# Patient Record
Sex: FEMALE | Race: WHITE | NOT HISPANIC OR LATINO | URBAN - METROPOLITAN AREA
[De-identification: names, ages, dates, MRNs, and addresses within clinical notes are randomized per-mention and may not be internally consistent; named-entity substitution may affect disease eponyms.]

---

## 2017-05-18 ENCOUNTER — OUTPATIENT (OUTPATIENT)
Dept: OUTPATIENT SERVICES | Facility: HOSPITAL | Age: 72
LOS: 1 days | End: 2017-05-18
Payer: MEDICARE

## 2017-05-18 VITALS
HEART RATE: 79 BPM | HEIGHT: 66 IN | RESPIRATION RATE: 14 BRPM | TEMPERATURE: 98 F | WEIGHT: 171.96 LBS | DIASTOLIC BLOOD PRESSURE: 70 MMHG | OXYGEN SATURATION: 98 % | SYSTOLIC BLOOD PRESSURE: 118 MMHG

## 2017-05-18 DIAGNOSIS — Z90.49 ACQUIRED ABSENCE OF OTHER SPECIFIED PARTS OF DIGESTIVE TRACT: Chronic | ICD-10-CM

## 2017-05-18 DIAGNOSIS — Z98.890 OTHER SPECIFIED POSTPROCEDURAL STATES: Chronic | ICD-10-CM

## 2017-05-18 DIAGNOSIS — K43.2 INCISIONAL HERNIA WITHOUT OBSTRUCTION OR GANGRENE: ICD-10-CM

## 2017-05-18 DIAGNOSIS — K45.0 OTHER SPECIFIED ABDOMINAL HERNIA WITH OBSTRUCTION, WITHOUT GANGRENE: ICD-10-CM

## 2017-05-18 DIAGNOSIS — Z01.818 ENCOUNTER FOR OTHER PREPROCEDURAL EXAMINATION: ICD-10-CM

## 2017-05-18 LAB
ANION GAP SERPL CALC-SCNC: 4 MMOL/L — LOW (ref 5–17)
BUN SERPL-MCNC: 15 MG/DL — SIGNIFICANT CHANGE UP (ref 7–23)
CALCIUM SERPL-MCNC: 9.1 MG/DL — SIGNIFICANT CHANGE UP (ref 8.4–10.5)
CHLORIDE SERPL-SCNC: 105 MMOL/L — SIGNIFICANT CHANGE UP (ref 96–108)
CO2 SERPL-SCNC: 30 MMOL/L — SIGNIFICANT CHANGE UP (ref 22–31)
CREAT SERPL-MCNC: 0.73 MG/DL — SIGNIFICANT CHANGE UP (ref 0.5–1.3)
GLUCOSE SERPL-MCNC: 103 MG/DL — HIGH (ref 70–99)
HCT VFR BLD CALC: 40.1 % — SIGNIFICANT CHANGE UP (ref 34.5–45)
HGB BLD-MCNC: 13.6 G/DL — SIGNIFICANT CHANGE UP (ref 11.5–15.5)
MCHC RBC-ENTMCNC: 29.4 PG — SIGNIFICANT CHANGE UP (ref 27–34)
MCHC RBC-ENTMCNC: 33.8 GM/DL — SIGNIFICANT CHANGE UP (ref 32–36)
MCV RBC AUTO: 86.9 FL — SIGNIFICANT CHANGE UP (ref 80–100)
PLATELET # BLD AUTO: 261 K/UL — SIGNIFICANT CHANGE UP (ref 150–400)
POTASSIUM SERPL-MCNC: 4.4 MMOL/L — SIGNIFICANT CHANGE UP (ref 3.5–5.3)
POTASSIUM SERPL-SCNC: 4.4 MMOL/L — SIGNIFICANT CHANGE UP (ref 3.5–5.3)
RBC # BLD: 4.62 M/UL — SIGNIFICANT CHANGE UP (ref 3.8–5.2)
RBC # FLD: 13 % — SIGNIFICANT CHANGE UP (ref 10.3–14.5)
SODIUM SERPL-SCNC: 139 MMOL/L — SIGNIFICANT CHANGE UP (ref 135–145)
WBC # BLD: 4.5 K/UL — SIGNIFICANT CHANGE UP (ref 3.8–10.5)
WBC # FLD AUTO: 4.5 K/UL — SIGNIFICANT CHANGE UP (ref 3.8–10.5)

## 2017-05-18 PROCEDURE — 80048 BASIC METABOLIC PNL TOTAL CA: CPT

## 2017-05-18 PROCEDURE — G0463: CPT

## 2017-05-18 PROCEDURE — 93005 ELECTROCARDIOGRAM TRACING: CPT

## 2017-05-18 PROCEDURE — 36415 COLL VENOUS BLD VENIPUNCTURE: CPT

## 2017-05-18 PROCEDURE — 85027 COMPLETE CBC AUTOMATED: CPT

## 2017-05-18 PROCEDURE — 93010 ELECTROCARDIOGRAM REPORT: CPT | Mod: NC

## 2017-05-18 RX ORDER — VALSARTAN 80 MG/1
1 TABLET ORAL
Qty: 0 | Refills: 0 | COMMUNITY

## 2017-05-18 RX ORDER — CHOLECALCIFEROL (VITAMIN D3) 125 MCG
0 CAPSULE ORAL
Qty: 0 | Refills: 0 | COMMUNITY

## 2017-05-18 NOTE — H&P PST ADULT - PSH
Ovarian cyst, left  1970's  S/P bunionectomy  right foot aug-  S/P Mohs surgery for basal cell carcinoma  2012  S/P small bowel resection  8/2016  Toe anomaly  toe right foot

## 2017-05-18 NOTE — H&P PST ADULT - HISTORY OF PRESENT ILLNESS
This is a 72 y/o female who had undergone small bowel resection August , 2016 presents with complaint of abdominal pain and incisional hernia  . patient states she noticed the bulge 2 months ago and she was symptomatic . Cat scan was done . scheduled for open repair incarcerated incisional hernia

## 2017-05-18 NOTE — H&P PST ADULT - PMH
Asthma  mild  Bronchitis  2/2017  Hypertension    Small bowel obstruction due to adhesions Asthma  mild  Bronchitis  2/2017  Hypertension    Incisional hernia    Small bowel obstruction due to adhesions

## 2017-05-18 NOTE — H&P PST ADULT - NSANTHOSAYNRD_GEN_A_CORE
No. KELSY screening performed.  STOP BANG Legend: 0-2 = LOW Risk; 3-4 = INTERMEDIATE Risk; 5-8 = HIGH Risk

## 2017-08-09 ENCOUNTER — APPOINTMENT (OUTPATIENT)
Dept: MAMMOGRAPHY | Facility: CLINIC | Age: 72
End: 2017-08-09
Payer: MEDICARE

## 2017-08-09 ENCOUNTER — OUTPATIENT (OUTPATIENT)
Dept: OUTPATIENT SERVICES | Facility: HOSPITAL | Age: 72
LOS: 1 days | End: 2017-08-09
Payer: MEDICARE

## 2017-08-09 DIAGNOSIS — Z90.49 ACQUIRED ABSENCE OF OTHER SPECIFIED PARTS OF DIGESTIVE TRACT: Chronic | ICD-10-CM

## 2017-08-09 DIAGNOSIS — Z00.8 ENCOUNTER FOR OTHER GENERAL EXAMINATION: ICD-10-CM

## 2017-08-09 DIAGNOSIS — Z98.890 OTHER SPECIFIED POSTPROCEDURAL STATES: Chronic | ICD-10-CM

## 2017-08-09 PROCEDURE — 77067 SCR MAMMO BI INCL CAD: CPT

## 2017-08-09 PROCEDURE — 77063 BREAST TOMOSYNTHESIS BI: CPT | Mod: 26

## 2017-08-09 PROCEDURE — G0202: CPT | Mod: 26

## 2017-08-09 PROCEDURE — 77063 BREAST TOMOSYNTHESIS BI: CPT

## 2018-08-13 ENCOUNTER — APPOINTMENT (OUTPATIENT)
Dept: MAMMOGRAPHY | Facility: CLINIC | Age: 73
End: 2018-08-13
Payer: MEDICARE

## 2018-08-13 ENCOUNTER — OUTPATIENT (OUTPATIENT)
Dept: OUTPATIENT SERVICES | Facility: HOSPITAL | Age: 73
LOS: 1 days | End: 2018-08-13
Payer: MEDICARE

## 2018-08-13 DIAGNOSIS — Z98.890 OTHER SPECIFIED POSTPROCEDURAL STATES: Chronic | ICD-10-CM

## 2018-08-13 DIAGNOSIS — Z00.8 ENCOUNTER FOR OTHER GENERAL EXAMINATION: ICD-10-CM

## 2018-08-13 DIAGNOSIS — Z90.49 ACQUIRED ABSENCE OF OTHER SPECIFIED PARTS OF DIGESTIVE TRACT: Chronic | ICD-10-CM

## 2018-08-13 PROBLEM — K43.2 INCISIONAL HERNIA WITHOUT OBSTRUCTION OR GANGRENE: Chronic | Status: ACTIVE | Noted: 2017-05-18

## 2018-08-13 PROBLEM — J40 BRONCHITIS, NOT SPECIFIED AS ACUTE OR CHRONIC: Chronic | Status: ACTIVE | Noted: 2017-05-18

## 2018-08-13 PROCEDURE — 77067 SCR MAMMO BI INCL CAD: CPT

## 2018-08-13 PROCEDURE — 77063 BREAST TOMOSYNTHESIS BI: CPT

## 2018-08-13 PROCEDURE — 77067 SCR MAMMO BI INCL CAD: CPT | Mod: 26

## 2018-08-13 PROCEDURE — 77063 BREAST TOMOSYNTHESIS BI: CPT | Mod: 26

## 2019-08-05 ENCOUNTER — RX RENEWAL (OUTPATIENT)
Age: 74
End: 2019-08-05

## 2019-08-13 ENCOUNTER — OUTPATIENT (OUTPATIENT)
Dept: OUTPATIENT SERVICES | Facility: HOSPITAL | Age: 74
LOS: 1 days | End: 2019-08-13
Payer: MEDICARE

## 2019-08-13 ENCOUNTER — APPOINTMENT (OUTPATIENT)
Dept: MAMMOGRAPHY | Facility: CLINIC | Age: 74
End: 2019-08-13
Payer: MEDICARE

## 2019-08-13 DIAGNOSIS — Z00.8 ENCOUNTER FOR OTHER GENERAL EXAMINATION: ICD-10-CM

## 2019-08-13 DIAGNOSIS — Z90.49 ACQUIRED ABSENCE OF OTHER SPECIFIED PARTS OF DIGESTIVE TRACT: Chronic | ICD-10-CM

## 2019-08-13 DIAGNOSIS — Z98.890 OTHER SPECIFIED POSTPROCEDURAL STATES: Chronic | ICD-10-CM

## 2019-08-13 PROCEDURE — 77063 BREAST TOMOSYNTHESIS BI: CPT | Mod: 26

## 2019-08-13 PROCEDURE — 77067 SCR MAMMO BI INCL CAD: CPT | Mod: 26

## 2019-08-13 PROCEDURE — 77063 BREAST TOMOSYNTHESIS BI: CPT

## 2019-08-13 PROCEDURE — 77067 SCR MAMMO BI INCL CAD: CPT

## 2019-09-03 ENCOUNTER — APPOINTMENT (OUTPATIENT)
Dept: INTERNAL MEDICINE | Facility: CLINIC | Age: 74
End: 2019-09-03
Payer: MEDICARE

## 2019-09-03 VITALS
HEART RATE: 81 BPM | WEIGHT: 165 LBS | BODY MASS INDEX: 25.01 KG/M2 | HEIGHT: 68 IN | DIASTOLIC BLOOD PRESSURE: 81 MMHG | SYSTOLIC BLOOD PRESSURE: 124 MMHG | OXYGEN SATURATION: 95 %

## 2019-09-03 PROCEDURE — 99213 OFFICE O/P EST LOW 20 MIN: CPT

## 2019-09-03 NOTE — PHYSICAL EXAM
[No Acute Distress] : no acute distress [Well Nourished] : well nourished [Normal Sclera/Conjunctiva] : normal sclera/conjunctiva [Normal Outer Ear/Nose] : the outer ears and nose were normal in appearance [Normal Oropharynx] : the oropharynx was normal [No JVD] : no jugular venous distention [No Lymphadenopathy] : no lymphadenopathy [No Respiratory Distress] : no respiratory distress  [No Accessory Muscle Use] : no accessory muscle use [Clear to Auscultation] : lungs were clear to auscultation bilaterally [Normal Rate] : normal rate  [Regular Rhythm] : with a regular rhythm [No Edema] : there was no peripheral edema [No Extremity Clubbing/Cyanosis] : no extremity clubbing/cyanosis [Soft] : abdomen soft [Non Tender] : non-tender [Non-distended] : non-distended [No Masses] : no abdominal mass palpated

## 2019-09-03 NOTE — REVIEW OF SYSTEMS
[Fever] : no fever [Chills] : no chills [Chest Pain] : no chest pain [Palpitations] : no palpitations [Shortness Of Breath] : no shortness of breath [Wheezing] : no wheezing [Cough] : no cough [Abdominal Pain] : no abdominal pain

## 2019-10-23 ENCOUNTER — APPOINTMENT (OUTPATIENT)
Dept: INTERNAL MEDICINE | Facility: CLINIC | Age: 74
End: 2019-10-23
Payer: MEDICARE

## 2019-10-23 VITALS — SYSTOLIC BLOOD PRESSURE: 124 MMHG | DIASTOLIC BLOOD PRESSURE: 80 MMHG

## 2019-10-23 VITALS
BODY MASS INDEX: 25.01 KG/M2 | TEMPERATURE: 98.2 F | HEART RATE: 72 BPM | WEIGHT: 165 LBS | OXYGEN SATURATION: 95 % | HEIGHT: 68 IN

## 2019-10-23 PROCEDURE — 99213 OFFICE O/P EST LOW 20 MIN: CPT | Mod: 25

## 2019-10-23 PROCEDURE — 36415 COLL VENOUS BLD VENIPUNCTURE: CPT

## 2019-10-23 NOTE — PHYSICAL EXAM
[No JVD] : no jugular venous distention [No Acute Distress] : no acute distress [No Lymphadenopathy] : no lymphadenopathy [Supple] : supple [No Respiratory Distress] : no respiratory distress  [Clear to Auscultation] : lungs were clear to auscultation bilaterally [No Accessory Muscle Use] : no accessory muscle use [Regular Rhythm] : with a regular rhythm [Normal Rate] : normal rate  [Normal S1, S2] : normal S1 and S2 [No Edema] : there was no peripheral edema [No Extremity Clubbing/Cyanosis] : no extremity clubbing/cyanosis [Soft] : abdomen soft [Non-distended] : non-distended [Non Tender] : non-tender

## 2019-10-23 NOTE — REVIEW OF SYSTEMS
[Fever] : no fever [Chills] : no chills [Chest Pain] : no chest pain [Palpitations] : no palpitations [Lower Ext Edema] : no lower extremity edema [Shortness Of Breath] : no shortness of breath [Wheezing] : no wheezing [Cough] : no cough [Nausea] : no nausea [Abdominal Pain] : no abdominal pain [Memory Loss] : no memory loss [Headache] : no headache

## 2019-10-26 LAB
25(OH)D3 SERPL-MCNC: 43.3 NG/ML
ALBUMIN SERPL ELPH-MCNC: 4.4 G/DL
ALP BLD-CCNC: 77 U/L
ALT SERPL-CCNC: 8 U/L
ANION GAP SERPL CALC-SCNC: 13 MMOL/L
AST SERPL-CCNC: 13 U/L
BASOPHILS # BLD AUTO: 0.04 K/UL
BASOPHILS NFR BLD AUTO: 0.6 %
BILIRUB SERPL-MCNC: 0.3 MG/DL
BUN SERPL-MCNC: 11 MG/DL
CALCIUM SERPL-MCNC: 9.9 MG/DL
CHLORIDE SERPL-SCNC: 101 MMOL/L
CHOLEST SERPL-MCNC: 206 MG/DL
CHOLEST/HDLC SERPL: 2.5 RATIO
CO2 SERPL-SCNC: 25 MMOL/L
CREAT SERPL-MCNC: 0.66 MG/DL
EOSINOPHIL # BLD AUTO: 0.19 K/UL
EOSINOPHIL NFR BLD AUTO: 2.6 %
GLUCOSE SERPL-MCNC: 88 MG/DL
HCT VFR BLD CALC: 39.6 %
HDLC SERPL-MCNC: 81 MG/DL
HGB BLD-MCNC: 12.7 G/DL
IMM GRANULOCYTES NFR BLD AUTO: 0.1 %
LDLC SERPL CALC-MCNC: 109 MG/DL
LYMPHOCYTES # BLD AUTO: 2.33 K/UL
LYMPHOCYTES NFR BLD AUTO: 32.3 %
MAN DIFF?: NORMAL
MCHC RBC-ENTMCNC: 28.2 PG
MCHC RBC-ENTMCNC: 32.1 GM/DL
MCV RBC AUTO: 87.8 FL
MONOCYTES # BLD AUTO: 0.53 K/UL
MONOCYTES NFR BLD AUTO: 7.3 %
NEUTROPHILS # BLD AUTO: 4.12 K/UL
NEUTROPHILS NFR BLD AUTO: 57.1 %
PLATELET # BLD AUTO: 261 K/UL
POTASSIUM SERPL-SCNC: 4.2 MMOL/L
PROT SERPL-MCNC: 6.7 G/DL
RBC # BLD: 4.51 M/UL
RBC # FLD: 14 %
SODIUM SERPL-SCNC: 139 MMOL/L
TRIGL SERPL-MCNC: 82 MG/DL
TSH SERPL-ACNC: 2.73 UIU/ML
WBC # FLD AUTO: 7.22 K/UL

## 2020-03-06 ENCOUNTER — APPOINTMENT (OUTPATIENT)
Dept: INTERNAL MEDICINE | Facility: CLINIC | Age: 75
End: 2020-03-06
Payer: MEDICARE

## 2020-03-06 VITALS — SYSTOLIC BLOOD PRESSURE: 134 MMHG | DIASTOLIC BLOOD PRESSURE: 74 MMHG

## 2020-03-06 VITALS
OXYGEN SATURATION: 92 % | HEIGHT: 68 IN | RESPIRATION RATE: 14 BRPM | HEART RATE: 73 BPM | TEMPERATURE: 98 F | WEIGHT: 163 LBS | BODY MASS INDEX: 24.71 KG/M2

## 2020-03-06 VITALS — OXYGEN SATURATION: 96 %

## 2020-03-06 PROCEDURE — 99213 OFFICE O/P EST LOW 20 MIN: CPT | Mod: 25

## 2020-03-06 PROCEDURE — G0444 DEPRESSION SCREEN ANNUAL: CPT | Mod: 59

## 2020-03-06 NOTE — REVIEW OF SYSTEMS
[Fever] : no fever [Chills] : no chills [Palpitations] : no palpitations [Lower Ext Edema] : no lower extremity edema [Shortness Of Breath] : no shortness of breath [Wheezing] : wheezing [Cough] : cough [Abdominal Pain] : no abdominal pain

## 2020-03-06 NOTE — HEALTH RISK ASSESSMENT
[No] : In the past 12 months have you used drugs other than those required for medical reasons? No [No falls in past year] : Patient reported no falls in the past year [0] : 2) Feeling down, depressed, or hopeless: Not at all (0) [[id number]] : 0 [RET4Ilhpf] : 1

## 2020-03-06 NOTE — PHYSICAL EXAM
[No Acute Distress] : no acute distress [Normal Sclera/Conjunctiva] : normal sclera/conjunctiva [PERRL] : pupils equal round and reactive to light [Normal Outer Ear/Nose] : the outer ears and nose were normal in appearance [Normal Oropharynx] : the oropharynx was normal [No JVD] : no jugular venous distention [No Lymphadenopathy] : no lymphadenopathy [No Respiratory Distress] : no respiratory distress  [No Accessory Muscle Use] : no accessory muscle use [Normal Rate] : normal rate  [Regular Rhythm] : with a regular rhythm [No Edema] : there was no peripheral edema [No Extremity Clubbing/Cyanosis] : no extremity clubbing/cyanosis [Soft] : abdomen soft [Non Tender] : non-tender [Non-distended] : non-distended [No Masses] : no abdominal mass palpated [Normal Posterior Cervical Nodes] : no posterior cervical lymphadenopathy [Normal Anterior Cervical Nodes] : no anterior cervical lymphadenopathy [No Spinal Tenderness] : no spinal tenderness [de-identified] : mild exp wheeze

## 2020-03-06 NOTE — HISTORY OF PRESENT ILLNESS
[de-identified] : Pt comes for bp check and occassional episodes of wheezing  Slight cough muocus mostly clear has been using proair and nebulizer with good response

## 2020-06-20 ENCOUNTER — APPOINTMENT (OUTPATIENT)
Dept: INTERNAL MEDICINE | Facility: CLINIC | Age: 75
End: 2020-06-20
Payer: MEDICARE

## 2020-06-20 VITALS
RESPIRATION RATE: 14 BRPM | HEART RATE: 72 BPM | WEIGHT: 164 LBS | HEIGHT: 68 IN | BODY MASS INDEX: 24.86 KG/M2 | OXYGEN SATURATION: 96 %

## 2020-06-20 VITALS — SYSTOLIC BLOOD PRESSURE: 134 MMHG | DIASTOLIC BLOOD PRESSURE: 78 MMHG

## 2020-06-20 PROCEDURE — 99213 OFFICE O/P EST LOW 20 MIN: CPT

## 2020-06-20 NOTE — PHYSICAL EXAM
[No Acute Distress] : no acute distress [Normal Sclera/Conjunctiva] : normal sclera/conjunctiva [PERRL] : pupils equal round and reactive to light [Normal Outer Ear/Nose] : the outer ears and nose were normal in appearance [Normal Oropharynx] : the oropharynx was normal [No JVD] : no jugular venous distention [No Lymphadenopathy] : no lymphadenopathy [Supple] : supple [No Respiratory Distress] : no respiratory distress  [No Accessory Muscle Use] : no accessory muscle use [Clear to Auscultation] : lungs were clear to auscultation bilaterally [Normal Rate] : normal rate  [Regular Rhythm] : with a regular rhythm [No Edema] : there was no peripheral edema [No Extremity Clubbing/Cyanosis] : no extremity clubbing/cyanosis [Soft] : abdomen soft

## 2020-06-26 ENCOUNTER — RX CHANGE (OUTPATIENT)
Age: 75
End: 2020-06-26

## 2020-08-14 ENCOUNTER — OUTPATIENT (OUTPATIENT)
Dept: OUTPATIENT SERVICES | Facility: HOSPITAL | Age: 75
LOS: 1 days | End: 2020-08-14
Payer: MEDICARE

## 2020-08-14 ENCOUNTER — APPOINTMENT (OUTPATIENT)
Dept: MAMMOGRAPHY | Facility: CLINIC | Age: 75
End: 2020-08-14
Payer: MEDICARE

## 2020-08-14 ENCOUNTER — RESULT REVIEW (OUTPATIENT)
Age: 75
End: 2020-08-14

## 2020-08-14 DIAGNOSIS — Z90.49 ACQUIRED ABSENCE OF OTHER SPECIFIED PARTS OF DIGESTIVE TRACT: Chronic | ICD-10-CM

## 2020-08-14 DIAGNOSIS — Z00.8 ENCOUNTER FOR OTHER GENERAL EXAMINATION: ICD-10-CM

## 2020-08-14 DIAGNOSIS — Z98.890 OTHER SPECIFIED POSTPROCEDURAL STATES: Chronic | ICD-10-CM

## 2020-08-14 PROCEDURE — 77063 BREAST TOMOSYNTHESIS BI: CPT

## 2020-08-14 PROCEDURE — 77063 BREAST TOMOSYNTHESIS BI: CPT | Mod: 26

## 2020-08-14 PROCEDURE — 77067 SCR MAMMO BI INCL CAD: CPT | Mod: 26

## 2020-08-14 PROCEDURE — 77067 SCR MAMMO BI INCL CAD: CPT

## 2020-09-08 ENCOUNTER — APPOINTMENT (OUTPATIENT)
Dept: INTERNAL MEDICINE | Facility: CLINIC | Age: 75
End: 2020-09-08
Payer: MEDICARE

## 2020-09-08 VITALS
HEART RATE: 78 BPM | TEMPERATURE: 98.4 F | HEIGHT: 68 IN | BODY MASS INDEX: 25.76 KG/M2 | WEIGHT: 170 LBS | OXYGEN SATURATION: 95 %

## 2020-09-08 VITALS — DIASTOLIC BLOOD PRESSURE: 80 MMHG | SYSTOLIC BLOOD PRESSURE: 130 MMHG

## 2020-09-08 DIAGNOSIS — Z23 ENCOUNTER FOR IMMUNIZATION: ICD-10-CM

## 2020-09-08 PROCEDURE — 99213 OFFICE O/P EST LOW 20 MIN: CPT | Mod: 25

## 2020-09-08 PROCEDURE — 36415 COLL VENOUS BLD VENIPUNCTURE: CPT

## 2020-09-08 PROCEDURE — G0009: CPT

## 2020-09-08 PROCEDURE — 90732 PPSV23 VACC 2 YRS+ SUBQ/IM: CPT

## 2020-09-08 PROCEDURE — G0439: CPT

## 2020-09-08 NOTE — PHYSICAL EXAM
[No Acute Distress] : no acute distress [Well Nourished] : well nourished [Normal Sclera/Conjunctiva] : normal sclera/conjunctiva [Normal Outer Ear/Nose] : the outer ears and nose were normal in appearance [PERRL] : pupils equal round and reactive to light [No JVD] : no jugular venous distention [No Lymphadenopathy] : no lymphadenopathy [No Respiratory Distress] : no respiratory distress  [No Accessory Muscle Use] : no accessory muscle use [Supple] : supple [Clear to Auscultation] : lungs were clear to auscultation bilaterally [Normal Rate] : normal rate  [No Edema] : there was no peripheral edema [Regular Rhythm] : with a regular rhythm [No Extremity Clubbing/Cyanosis] : no extremity clubbing/cyanosis [Non Tender] : non-tender [Soft] : abdomen soft

## 2020-09-08 NOTE — REVIEW OF SYSTEMS
[Fever] : no fever [Chills] : no chills [Chest Pain] : no chest pain [Palpitations] : no palpitations [Shortness Of Breath] : no shortness of breath [Lower Ext Edema] : no lower extremity edema [Abdominal Pain] : no abdominal pain [Cough] : no cough [Wheezing] : no wheezing

## 2020-09-08 NOTE — HISTORY OF PRESENT ILLNESS
[de-identified] : Pt comes for bp check and med renewal and pneumovax and annual medicare well visit.  Pt sees me and GYN Dr Brennan.

## 2020-09-08 NOTE — HEALTH RISK ASSESSMENT
[Yes] : Yes [Monthly or less (1 pt)] : Monthly or less (1 point) [1 or 2 (0 pts)] : 1 or 2 (0 points) [Never (0 pts)] : Never (0 points) [No] : In the past 12 months have you used drugs other than those required for medical reasons? No [No falls in past year] : Patient reported no falls in the past year [0] : 2) Feeling down, depressed, or hopeless: Not at all (0) [Spatial Ability and Orientation] : difficulty with spatial ability and orientation [None] : None [Retired] : retired [Feels Safe at Home] : Feels safe at home [Fully functional (bathing, dressing, toileting, transferring, walking, feeding)] : Fully functional (bathing, dressing, toileting, transferring, walking, feeding) [Fully functional (using the telephone, shopping, preparing meals, housekeeping, doing laundry, using] : Fully functional and needs no help or supervision to perform IADLs (using the telephone, shopping, preparing meals, housekeeping, doing laundry, using transportation, managing medications and managing finances) [Reports normal functional visual acuity (ie: able to read med bottle)] : Reports normal functional visual acuity [With Patient/Caregiver] : With Patient/Caregiver [Designated Healthcare Proxy] : Designated healthcare proxy [Name: ___] : Health Care Proxy's Name: [unfilled]  [Relationship: ___] : Relationship: [unfilled] [I will adhere to the patient's wishes as expressed in the advance directive except as noted below.] : I will adhere to the patient's wishes as expressed in the advance directive except as noted below [] : No [Audit-CScore] : 1 [[id number]] : 0 [Change in mental status noted] : No change in mental status noted [Language] : denies difficulty with language [Learning/Retaining New Information] : denies difficulty learning/retaining new information [Handling Complex Tasks] : denies difficulty handling complex tasks [Behavior] : denies difficulty with behavior [Reasoning] : denies difficulty with reasoning [Reports changes in hearing] : Reports no changes in hearing [Reports changes in vision] : Reports no changes in vision [Reports changes in dental health] : Reports no changes in dental health [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

## 2020-09-09 LAB
25(OH)D3 SERPL-MCNC: 46.7 NG/ML
ALBUMIN SERPL ELPH-MCNC: 4.4 G/DL
ALP BLD-CCNC: 79 U/L
ALT SERPL-CCNC: 10 U/L
ANION GAP SERPL CALC-SCNC: 10 MMOL/L
AST SERPL-CCNC: 14 U/L
BASOPHILS # BLD AUTO: 0.04 K/UL
BASOPHILS NFR BLD AUTO: 0.5 %
BILIRUB SERPL-MCNC: 0.4 MG/DL
BUN SERPL-MCNC: 9 MG/DL
CALCIUM SERPL-MCNC: 10.1 MG/DL
CHLORIDE SERPL-SCNC: 101 MMOL/L
CHOLEST SERPL-MCNC: 213 MG/DL
CHOLEST/HDLC SERPL: 3 RATIO
CO2 SERPL-SCNC: 25 MMOL/L
CREAT SERPL-MCNC: 0.77 MG/DL
EOSINOPHIL # BLD AUTO: 0.22 K/UL
EOSINOPHIL NFR BLD AUTO: 2.7 %
GLUCOSE SERPL-MCNC: 87 MG/DL
HCT VFR BLD CALC: 39.2 %
HDLC SERPL-MCNC: 71 MG/DL
HGB BLD-MCNC: 12.7 G/DL
IMM GRANULOCYTES NFR BLD AUTO: 0.1 %
LDLC SERPL CALC-MCNC: 122 MG/DL
LYMPHOCYTES # BLD AUTO: 2.68 K/UL
LYMPHOCYTES NFR BLD AUTO: 33.4 %
MAN DIFF?: NORMAL
MCHC RBC-ENTMCNC: 28.1 PG
MCHC RBC-ENTMCNC: 32.4 GM/DL
MCV RBC AUTO: 86.7 FL
MONOCYTES # BLD AUTO: 0.75 K/UL
MONOCYTES NFR BLD AUTO: 9.3 %
NEUTROPHILS # BLD AUTO: 4.33 K/UL
NEUTROPHILS NFR BLD AUTO: 54 %
PLATELET # BLD AUTO: 242 K/UL
POTASSIUM SERPL-SCNC: 4.1 MMOL/L
PROT SERPL-MCNC: 6.7 G/DL
RBC # BLD: 4.52 M/UL
RBC # FLD: 13.9 %
SARS-COV-2 IGG SERPL IA-ACNC: <3.8 AU/ML
SARS-COV-2 IGG SERPL QL IA: NEGATIVE
SODIUM SERPL-SCNC: 137 MMOL/L
TRIGL SERPL-MCNC: 102 MG/DL
TSH SERPL-ACNC: 4.06 UIU/ML
WBC # FLD AUTO: 8.03 K/UL

## 2020-12-14 ENCOUNTER — APPOINTMENT (OUTPATIENT)
Dept: INTERNAL MEDICINE | Facility: CLINIC | Age: 75
End: 2020-12-14
Payer: MEDICARE

## 2020-12-14 VITALS
OXYGEN SATURATION: 95 % | TEMPERATURE: 98.2 F | BODY MASS INDEX: 24.86 KG/M2 | WEIGHT: 164 LBS | HEART RATE: 85 BPM | HEIGHT: 68 IN | RESPIRATION RATE: 14 BRPM

## 2020-12-14 VITALS — SYSTOLIC BLOOD PRESSURE: 130 MMHG | DIASTOLIC BLOOD PRESSURE: 80 MMHG

## 2020-12-14 DIAGNOSIS — M23.90 UNSPECIFIED INTERNAL DERANGEMENT OF UNSPECIFIED KNEE: ICD-10-CM

## 2020-12-14 PROCEDURE — 99213 OFFICE O/P EST LOW 20 MIN: CPT

## 2020-12-14 NOTE — HISTORY OF PRESENT ILLNESS
[de-identified] : Pt comes for bp check and left knee swelling   Saw ortho and had MRI showed tear

## 2020-12-14 NOTE — REVIEW OF SYSTEMS
[Joint Pain] : joint pain [Joint Stiffness] : joint stiffness [Joint Swelling] : joint swelling [Fever] : no fever [Chills] : no chills [Chest Pain] : no chest pain [Palpitations] : no palpitations [Lower Ext Edema] : no lower extremity edema [Shortness Of Breath] : no shortness of breath [Wheezing] : no wheezing [Cough] : no cough [Abdominal Pain] : no abdominal pain

## 2020-12-14 NOTE — PHYSICAL EXAM
[No Acute Distress] : no acute distress [Normal Sclera/Conjunctiva] : normal sclera/conjunctiva [Normal Outer Ear/Nose] : the outer ears and nose were normal in appearance [No JVD] : no jugular venous distention [No Respiratory Distress] : no respiratory distress  [No Accessory Muscle Use] : no accessory muscle use [Clear to Auscultation] : lungs were clear to auscultation bilaterally [Normal Rate] : normal rate  [Regular Rhythm] : with a regular rhythm [No Extremity Clubbing/Cyanosis] : no extremity clubbing/cyanosis [Soft] : abdomen soft [Non Tender] : non-tender [de-identified] : left knee swollen and warm

## 2021-01-28 DIAGNOSIS — M25.562 PAIN IN LEFT KNEE: ICD-10-CM

## 2021-01-29 ENCOUNTER — APPOINTMENT (OUTPATIENT)
Dept: ORTHOPEDIC SURGERY | Facility: CLINIC | Age: 76
End: 2021-01-29
Payer: MEDICARE

## 2021-01-29 VITALS — WEIGHT: 174 LBS | HEIGHT: 68 IN | BODY MASS INDEX: 26.37 KG/M2

## 2021-01-29 DIAGNOSIS — S83.242A OTHER TEAR OF MEDIAL MENISCUS, CURRENT INJURY, LEFT KNEE, INITIAL ENCOUNTER: ICD-10-CM

## 2021-01-29 PROCEDURE — 73564 X-RAY EXAM KNEE 4 OR MORE: CPT | Mod: LT

## 2021-01-29 PROCEDURE — 99204 OFFICE O/P NEW MOD 45 MIN: CPT

## 2021-01-29 PROCEDURE — 73560 X-RAY EXAM OF KNEE 1 OR 2: CPT | Mod: RT

## 2021-01-29 NOTE — DISCUSSION/SUMMARY
[de-identified] : Discussed at length the nature of the patient’s condition. Their left knee symptoms appear secondary to \par \par She can continue activities as tolerated. All questions answered, understanding verbalized. Patient in agreement with plan of care. Patient may follow up with x-rays in  { }

## 2021-01-29 NOTE — HISTORY OF PRESENT ILLNESS
[de-identified] : SHEREEN BURTON is a 75 year old female who presents for initial evaluation of left acute knee pain.  She has had left knee pain since November.  She does not recall and specific injury or reason her knee started hurting.  Her pain is mostly posteromedial with some anterior pain.  She also endorses swelling and stiffness but denies any mechanical symptoms.  She saw Dr. Cruz who ordered an MRI of her left knee as well as physical therapy and an anti-inflammatory.  These helped somewhat.  Her MRI showed a complex tear of her posteromedial meniscus.  She followed up with a surgeon who did not think she was a candidate for an arthroscopic debridement.  She also had acupuncture of her knee which she thinks helped bring down the swelling a lot.  She is otherwise healthy and has no allergies.

## 2021-01-29 NOTE — HISTORY OF PRESENT ILLNESS
[de-identified] : SHEREEN BURTON is a 75 year old female who presents for initial evaluation of left acute knee pain.  She has had left knee pain since November.  She does not recall and specific injury or reason her knee started hurting.  Her pain is mostly posteromedial with some anterior pain.  She also endorses swelling and stiffness but denies any mechanical symptoms.  She saw Dr. Cruz who ordered an MRI of her left knee as well as physical therapy and an anti-inflammatory.  These helped somewhat.  Her MRI showed a complex tear of her posteromedial meniscus.  She followed up with a surgeon who did not think she was a candidate for an arthroscopic debridement.  She also had acupuncture of her knee which she thinks helped bring down the swelling a lot.  She is otherwise healthy and has no allergies.

## 2021-01-29 NOTE — ASSESSMENT
[FreeTextEntry1] : Left knee symptoms related to torn medial meniscus and osteoarthritis, reviewed natural history and non-op and operative treatment. Suggested physiotherapy, and Mobic 7.5 QD, will continue acupuncture since provided relief. Activities as tolerated, followup in 6-8 weeks if not improved may consider a surgical arthroscopy but explained may have residual symptoms related to DJD

## 2021-01-29 NOTE — PHYSICAL EXAM
[de-identified] : General appearance: well nourished and hydrated, pleasant, alert and oriented x 3, cooperative.\par HEENT: Normocephalic, EOM intact, Nasal septum midline, Oral cavity clear, External auditory canal clear.\par Cardiovascular:varicosities, mild edema\par Lymphatics Lymph nodes: none palpated, Lymphedema: not present.\par Neurologic: sensation is normal, no muscle weakness in upper or lower extremities, patella tendon reflexes intact .\par Dermatologic no apparent skin lesions, moist, warm, no rash.\par Spine:cervical spine appears normal and moves freely, thoracic spine appears normal and moves freely, lumbosacral spine appears normal and moves freely.\par Gait: nonantalgic.\par \par Left knee\par Inspection: trace effusion, small popiteal cyst\par Wounds: none.\par Alignment: normal.\par Palpation: medial joint line tenderness\par ROM active (in degrees):  with discomfort and crepitus 10 degree flexion contracture\par Ligamentous laxity: all ligaments appear stable,, negative ant. drawer test, negative post. drawer test, stable to varus stress test, stable to valgus stress test. negative Lachman's test, negative pivot shift test\par Meniscal Test: psoitive McMurrays, positive Sulma.\par Patellofemoral Alignment Test: Q angle-, normal.\par Muscle Test: good quad strength.\par \par Right knee\par Inspection: no effusion or erythema.\par Wounds: none.\par Alignment: normal.\par Palpation: no specific tenderness on palpation.\par ROM active (in degrees): 0-120 with crepitus\par Ligamentous laxity: all ligaments appear stable,, negative ant. drawer test, negative post. drawer test, stable to varus stress test, stable to valgus stress test. negative Lachman's test, negative pivot shift test\par Meniscal Test: negative McMurrays, negative Sulma.\par Patellofemoral Alignment Test: Q angle-, normal.\par Muscle Test: good quad strength.\par \par Left hip\par Inspection: No swelling or ecchymosis.\par Wounds: none.\par Palpation: non-tender.\par Stability: no instability.\par Strength: 5/5 all motor groups.\par ROM: no pain with FROM.\par Leg length: equal.\par \par Right hip\par Inspection: No swelling or ecchymosis.\par Wounds: none.\par Palpation: non-tender.\par Stability: no instability.\par Strength: 5/5 all motor groups.\par ROM: no pain with FROM.\par Leg length: equal.\par \par Left ankle\par Inspection: no erythema noted, no swelling noted.\par Palpation: no pain on palpation .\par ROM: FROM without crepitus.\par Muscle strength: 5/5.\par Stability: no instability noted.\par \par Right ankle\par Inspection: no erythema noted, no swelling noted.\par ROM: FROM without crepitus.\par Palpation: no pain on palpation .\par Muscle strength: 5/5.\par Stability: no instability noted.\par \par Left foot\par Inspection: color, texture and turgor are normal. hallux valgus\par ROM: full range of motion of all joints without pain or crepitus.\par Palpation: no tenderness.\par Stability: no instability noted.\par \par Right foot\par Inspection: color, texture and turgor are normal. hullx valgus\par ROM: full range of motion of all joints without pain or crepitus.\par Palpation: no tenderness.\par Stability: no instability noted.\par \par Left shoulder\par Inspection: no muscle asymmetry, no atrophy.\par Palpation: no tenderness noted, ACJ non-tender.\par ROM: full active ROM, full passive ROM.\par Strength testing): anterior deltoid, supraspinatus, infraspinatus, subscapularis all 5/5.\par Stability test: ant. apprehension negative, post. apprehension negative, relocation test negative.\par Impingement Test: negative NEER.\par \par Right shoulder\par Inspection: no muscle asymmetry, no atrophy.\par Palpation: no tenderness noted, ACJ non-tender.\par ROM: full active ROM, full passive ROM.\par Strength testing): anterior deltoid, supraspinatus, infraspinatus, subscapularis all 5/5.\par Stability test: ant. apprehension negative, post. apprehension negative, relocation test negative.\par Impingement Test: negative NEER.\par Surgical Wounds: none.\par \par Left elbow\par Inspection: negative swelling.\par Wounds: none.\par Palpation: non-tender.\par ROM: full ROM.\par Strength: 5/5 all groups.\par Stability: no instability.\par Mass: none.\par \par Right elbow\par Inspection: negative swelling.\par Wounds: none.\par Palpation: non-tender.\par ROM: full ROM.\par Strength: 5/5 all groups.\par Stability: no instability.\par Mass: none.\par \par Left wrist\par Inspection: negative swelling.\par Wound: none.\par Palpation (bone): no tenderness.\par ROM: full ROM.\par Strength: full , good.\par \par Right wrist\par Inspection: negative swelling.\par Wound: none.\par Palpation (bone): no tenderness.\par ROM: full ROM.\par Strength: full , good.\par \par Left hand\par Inspection: no skin changes, normal appearance.\par Wounds: none.\par Strength: full , able to make full fist.\par Sensation: light touch intact all fingers and thumb.\par Vascular: good capillary refill < 3 seconds, all fingers and thumb.\par Mass: none.\par \par Right hand\par Inspection: no skin changes, normal appearance.\par Wounds: none.\par Strength: full , able to make full fist.\par Sensation: light touch intact all fingers and thumb.\par Vascular: good capillary refill < 3 seconds, all fingers and thumb.\par Mass: none.  [de-identified] : Left knee xrays, standing AP/Lateral and Merchant films, and 45 degree PA standing view, taken at the office today shows normal alignment, mild decrease medial joint height, radiolucency lateral tibial plateau cortex appears in tact, patella sits at an appropriate height in a central position with joint space narrowing, Kellgren and Dmitry grade 1-2\par \par Right knee xray merchant view taken at the office today demonstrates patella sits at an appropriate height in a central position, joint space narrowing, patellofemoral arthritis \par \par MRI 12/8/20 report on the chart. medial meniscus tear, DJD and bone edema

## 2021-01-29 NOTE — PHYSICAL EXAM
[de-identified] : General appearance: well nourished and hydrated, pleasant, alert and oriented x 3, cooperative.\par HEENT: Normocephalic, EOM intact, Nasal septum midline, Oral cavity clear, External auditory canal clear.\par Cardiovascular:varicosities, mild edema\par Lymphatics Lymph nodes: none palpated, Lymphedema: not present.\par Neurologic: sensation is normal, no muscle weakness in upper or lower extremities, patella tendon reflexes intact .\par Dermatologic no apparent skin lesions, moist, warm, no rash.\par Spine:cervical spine appears normal and moves freely, thoracic spine appears normal and moves freely, lumbosacral spine appears normal and moves freely.\par Gait: nonantalgic.\par \par Left knee\par Inspection: trace effusion, small popiteal cyst\par Wounds: none.\par Alignment: normal.\par Palpation: medial joint line tenderness\par ROM active (in degrees):  with discomfort and crepitus 10 degree flexion contracture\par Ligamentous laxity: all ligaments appear stable,, negative ant. drawer test, negative post. drawer test, stable to varus stress test, stable to valgus stress test. negative Lachman's test, negative pivot shift test\par Meniscal Test: psoitive McMurrays, positive Sulma.\par Patellofemoral Alignment Test: Q angle-, normal.\par Muscle Test: good quad strength.\par \par Right knee\par Inspection: no effusion or erythema.\par Wounds: none.\par Alignment: normal.\par Palpation: no specific tenderness on palpation.\par ROM active (in degrees): 0-120 with crepitus\par Ligamentous laxity: all ligaments appear stable,, negative ant. drawer test, negative post. drawer test, stable to varus stress test, stable to valgus stress test. negative Lachman's test, negative pivot shift test\par Meniscal Test: negative McMurrays, negative Sulma.\par Patellofemoral Alignment Test: Q angle-, normal.\par Muscle Test: good quad strength.\par \par Left hip\par Inspection: No swelling or ecchymosis.\par Wounds: none.\par Palpation: non-tender.\par Stability: no instability.\par Strength: 5/5 all motor groups.\par ROM: no pain with FROM.\par Leg length: equal.\par \par Right hip\par Inspection: No swelling or ecchymosis.\par Wounds: none.\par Palpation: non-tender.\par Stability: no instability.\par Strength: 5/5 all motor groups.\par ROM: no pain with FROM.\par Leg length: equal.\par \par Left ankle\par Inspection: no erythema noted, no swelling noted.\par Palpation: no pain on palpation .\par ROM: FROM without crepitus.\par Muscle strength: 5/5.\par Stability: no instability noted.\par \par Right ankle\par Inspection: no erythema noted, no swelling noted.\par ROM: FROM without crepitus.\par Palpation: no pain on palpation .\par Muscle strength: 5/5.\par Stability: no instability noted.\par \par Left foot\par Inspection: color, texture and turgor are normal. hallux valgus\par ROM: full range of motion of all joints without pain or crepitus.\par Palpation: no tenderness.\par Stability: no instability noted.\par \par Right foot\par Inspection: color, texture and turgor are normal. hullx valgus\par ROM: full range of motion of all joints without pain or crepitus.\par Palpation: no tenderness.\par Stability: no instability noted.\par \par Left shoulder\par Inspection: no muscle asymmetry, no atrophy.\par Palpation: no tenderness noted, ACJ non-tender.\par ROM: full active ROM, full passive ROM.\par Strength testing): anterior deltoid, supraspinatus, infraspinatus, subscapularis all 5/5.\par Stability test: ant. apprehension negative, post. apprehension negative, relocation test negative.\par Impingement Test: negative NEER.\par \par Right shoulder\par Inspection: no muscle asymmetry, no atrophy.\par Palpation: no tenderness noted, ACJ non-tender.\par ROM: full active ROM, full passive ROM.\par Strength testing): anterior deltoid, supraspinatus, infraspinatus, subscapularis all 5/5.\par Stability test: ant. apprehension negative, post. apprehension negative, relocation test negative.\par Impingement Test: negative NEER.\par Surgical Wounds: none.\par \par Left elbow\par Inspection: negative swelling.\par Wounds: none.\par Palpation: non-tender.\par ROM: full ROM.\par Strength: 5/5 all groups.\par Stability: no instability.\par Mass: none.\par \par Right elbow\par Inspection: negative swelling.\par Wounds: none.\par Palpation: non-tender.\par ROM: full ROM.\par Strength: 5/5 all groups.\par Stability: no instability.\par Mass: none.\par \par Left wrist\par Inspection: negative swelling.\par Wound: none.\par Palpation (bone): no tenderness.\par ROM: full ROM.\par Strength: full , good.\par \par Right wrist\par Inspection: negative swelling.\par Wound: none.\par Palpation (bone): no tenderness.\par ROM: full ROM.\par Strength: full , good.\par \par Left hand\par Inspection: no skin changes, normal appearance.\par Wounds: none.\par Strength: full , able to make full fist.\par Sensation: light touch intact all fingers and thumb.\par Vascular: good capillary refill < 3 seconds, all fingers and thumb.\par Mass: none.\par \par Right hand\par Inspection: no skin changes, normal appearance.\par Wounds: none.\par Strength: full , able to make full fist.\par Sensation: light touch intact all fingers and thumb.\par Vascular: good capillary refill < 3 seconds, all fingers and thumb.\par Mass: none.  [de-identified] : Left knee xrays, standing AP/Lateral and Merchant films, and 45 degree PA standing view, taken at the office today shows normal alignment, mild decrease medial joint height, radiolucency lateral tibial plateau cortex appears in tact, patella sits at an appropriate height in a central position with joint space narrowing, Kellgren and Dmitry grade 1-2\par \par Right knee xray merchant view taken at the office today demonstrates patella sits at an appropriate height in a central position, joint space narrowing, patellofemoral arthritis \par \par MRI 12/8/20 report on the chart. medial meniscus tear, DJD and bone edema

## 2021-01-29 NOTE — DISCUSSION/SUMMARY
[de-identified] : Discussed at length the nature of the patient’s condition. Their left knee symptoms appear secondary to \par \par She can continue activities as tolerated. All questions answered, understanding verbalized. Patient in agreement with plan of care. Patient may follow up with x-rays in  { }

## 2021-03-09 ENCOUNTER — RX RENEWAL (OUTPATIENT)
Age: 76
End: 2021-03-09

## 2021-04-13 ENCOUNTER — APPOINTMENT (OUTPATIENT)
Dept: INTERNAL MEDICINE | Facility: CLINIC | Age: 76
End: 2021-04-13
Payer: MEDICARE

## 2021-04-13 VITALS — DIASTOLIC BLOOD PRESSURE: 82 MMHG | SYSTOLIC BLOOD PRESSURE: 132 MMHG

## 2021-04-13 VITALS
HEIGHT: 68 IN | WEIGHT: 174 LBS | HEART RATE: 61 BPM | BODY MASS INDEX: 26.37 KG/M2 | OXYGEN SATURATION: 98 % | TEMPERATURE: 97 F

## 2021-04-13 DIAGNOSIS — M17.12 UNILATERAL PRIMARY OSTEOARTHRITIS, LEFT KNEE: ICD-10-CM

## 2021-04-13 PROCEDURE — 99213 OFFICE O/P EST LOW 20 MIN: CPT

## 2021-04-13 NOTE — HISTORY OF PRESENT ILLNESS
[de-identified] : Pt comes for bp check and med renewal  Going to gym regularly  Using inhaler and breathing better

## 2021-04-13 NOTE — PHYSICAL EXAM
[No Acute Distress] : no acute distress [Normal Sclera/Conjunctiva] : normal sclera/conjunctiva [Normal Outer Ear/Nose] : the outer ears and nose were normal in appearance [No JVD] : no jugular venous distention [No Respiratory Distress] : no respiratory distress  [No Accessory Muscle Use] : no accessory muscle use [Clear to Auscultation] : lungs were clear to auscultation bilaterally [Normal Rate] : normal rate  [Regular Rhythm] : with a regular rhythm [No Edema] : there was no peripheral edema [No Extremity Clubbing/Cyanosis] : no extremity clubbing/cyanosis [Soft] : abdomen soft [Non Tender] : non-tender [Non-distended] : non-distended [Grossly Normal Strength/Tone] : grossly normal strength/tone

## 2021-04-13 NOTE — REVIEW OF SYSTEMS
[Joint Pain] : joint pain [Joint Stiffness] : joint stiffness [Fever] : no fever [Chills] : no chills [Chest Pain] : no chest pain [Lower Ext Edema] : no lower extremity edema [Palpitations] : no palpitations [Shortness Of Breath] : no shortness of breath [Wheezing] : no wheezing [Cough] : no cough [Abdominal Pain] : no abdominal pain

## 2021-08-06 DIAGNOSIS — M54.9 DORSALGIA, UNSPECIFIED: ICD-10-CM

## 2021-08-14 ENCOUNTER — RESULT REVIEW (OUTPATIENT)
Age: 76
End: 2021-08-14

## 2021-08-14 ENCOUNTER — OUTPATIENT (OUTPATIENT)
Dept: OUTPATIENT SERVICES | Facility: HOSPITAL | Age: 76
LOS: 1 days | End: 2021-08-14
Payer: MEDICARE

## 2021-08-14 ENCOUNTER — APPOINTMENT (OUTPATIENT)
Dept: MAMMOGRAPHY | Facility: CLINIC | Age: 76
End: 2021-08-14
Payer: MEDICARE

## 2021-08-14 DIAGNOSIS — Z98.890 OTHER SPECIFIED POSTPROCEDURAL STATES: Chronic | ICD-10-CM

## 2021-08-14 DIAGNOSIS — Z90.49 ACQUIRED ABSENCE OF OTHER SPECIFIED PARTS OF DIGESTIVE TRACT: Chronic | ICD-10-CM

## 2021-08-14 DIAGNOSIS — Z12.31 ENCOUNTER FOR SCREENING MAMMOGRAM FOR MALIGNANT NEOPLASM OF BREAST: ICD-10-CM

## 2021-08-14 PROCEDURE — 77063 BREAST TOMOSYNTHESIS BI: CPT

## 2021-08-14 PROCEDURE — 77067 SCR MAMMO BI INCL CAD: CPT

## 2021-08-14 PROCEDURE — 77063 BREAST TOMOSYNTHESIS BI: CPT | Mod: 26

## 2021-08-14 PROCEDURE — 77067 SCR MAMMO BI INCL CAD: CPT | Mod: 26

## 2021-09-10 ENCOUNTER — NON-APPOINTMENT (OUTPATIENT)
Age: 76
End: 2021-09-10

## 2021-09-10 ENCOUNTER — APPOINTMENT (OUTPATIENT)
Dept: INTERNAL MEDICINE | Facility: CLINIC | Age: 76
End: 2021-09-10
Payer: MEDICARE

## 2021-09-10 VITALS — DIASTOLIC BLOOD PRESSURE: 82 MMHG | SYSTOLIC BLOOD PRESSURE: 130 MMHG

## 2021-09-10 VITALS
OXYGEN SATURATION: 94 % | HEIGHT: 68 IN | BODY MASS INDEX: 25.16 KG/M2 | WEIGHT: 166 LBS | HEART RATE: 98 BPM | TEMPERATURE: 98 F

## 2021-09-10 DIAGNOSIS — Z00.00 ENCOUNTER FOR GENERAL ADULT MEDICAL EXAMINATION W/OUT ABNORMAL FINDINGS: ICD-10-CM

## 2021-09-10 PROCEDURE — 36415 COLL VENOUS BLD VENIPUNCTURE: CPT

## 2021-09-10 PROCEDURE — G0439: CPT

## 2021-09-10 PROCEDURE — 99214 OFFICE O/P EST MOD 30 MIN: CPT | Mod: CS,25

## 2021-09-10 PROCEDURE — 93000 ELECTROCARDIOGRAM COMPLETE: CPT

## 2021-09-10 RX ORDER — IPRATROPIUM BROMIDE AND ALBUTEROL SULFATE 2.5; .5 MG/3ML; MG/3ML
0.5-2.5 (3) SOLUTION RESPIRATORY (INHALATION) 3 TIMES DAILY
Qty: 2 | Refills: 20 | Status: ACTIVE | COMMUNITY
Start: 2020-03-06 | End: 1900-01-01

## 2021-09-10 NOTE — HISTORY OF PRESENT ILLNESS
[de-identified] : Pt comes for FBW and EKG and med renewal and annualmedicare well visit  Pt sees Dr Brennan GYN and Dr Jigna Faria GI   Pt thought she had covid in may and took meds from [place]  Ivermectin and hydroxychloroquine on her own and was better in 2 days

## 2021-09-10 NOTE — HEALTH RISK ASSESSMENT
[Never (0 pts)] : Never (0 points) [No] : In the past 12 months have you used drugs other than those required for medical reasons? No [No falls in past year] : Patient reported no falls in the past year [0] : 2) Feeling down, depressed, or hopeless: Not at all (0) [PHQ-2 Negative - No further assessment needed] : PHQ-2 Negative - No further assessment needed [None] : None [Alone] : lives alone [Feels Safe at Home] : Feels safe at home [Fully functional (bathing, dressing, toileting, transferring, walking, feeding)] : Fully functional (bathing, dressing, toileting, transferring, walking, feeding) [Fully functional (using the telephone, shopping, preparing meals, housekeeping, doing laundry, using] : Fully functional and needs no help or supervision to perform IADLs (using the telephone, shopping, preparing meals, housekeeping, doing laundry, using transportation, managing medications and managing finances) [Reports normal functional visual acuity (ie: able to read med bottle)] : Reports normal functional visual acuity [With Patient/Caregiver] : , with patient/caregiver [Designated Healthcare Proxy] : Designated healthcare proxy [Name: ___] : Health Care Proxy's Name: [unfilled]  [Relationship: ___] : Relationship: [unfilled] [I will adhere to the patient's wishes.] : I will adhere to the patient's wishes. [] : No [Audit-CScore] : 0 [[id number]] : 0 [Change in mental status noted] : No change in mental status noted [Language] : denies difficulty with language [Behavior] : denies difficulty with behavior [Learning/Retaining New Information] : denies difficulty learning/retaining new information [Handling Complex Tasks] : denies difficulty handling complex tasks [Reasoning] : denies difficulty with reasoning [Spatial Ability and Orientation] : denies difficulty with spatial ability and orientation [Reports changes in hearing] : Reports no changes in hearing [Reports changes in vision] : Reports no changes in vision [Reports changes in dental health] : Reports no changes in dental health [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

## 2021-09-10 NOTE — PHYSICAL EXAM
[No Acute Distress] : no acute distress [Well Nourished] : well nourished [Normal Sclera/Conjunctiva] : normal sclera/conjunctiva [PERRL] : pupils equal round and reactive to light [Normal Outer Ear/Nose] : the outer ears and nose were normal in appearance [Normal Oropharynx] : the oropharynx was normal [No JVD] : no jugular venous distention [No Lymphadenopathy] : no lymphadenopathy [Supple] : supple [No Respiratory Distress] : no respiratory distress  [No Accessory Muscle Use] : no accessory muscle use [Clear to Auscultation] : lungs were clear to auscultation bilaterally [Normal Rate] : normal rate  [Regular Rhythm] : with a regular rhythm [No Carotid Bruits] : no carotid bruits [Pedal Pulses Present] : the pedal pulses are present [No Edema] : there was no peripheral edema [Declined Breast Exam] : declined breast exam  [Soft] : abdomen soft [Non Tender] : non-tender [Non-distended] : non-distended [No Masses] : no abdominal mass palpated [Normal Bowel Sounds] : normal bowel sounds [Declined Rectal Exam] : declined rectal exam [Normal Posterior Cervical Nodes] : no posterior cervical lymphadenopathy [Normal Anterior Cervical Nodes] : no anterior cervical lymphadenopathy [No Spinal Tenderness] : no spinal tenderness [Grossly Normal Strength/Tone] : grossly normal strength/tone [No Rash] : no rash [No Focal Deficits] : no focal deficits [Normal Gait] : normal gait [Normal Affect] : the affect was normal [Normal Insight/Judgement] : insight and judgment were intact

## 2021-09-10 NOTE — REVIEW OF SYSTEMS
[Fever] : no fever [Chills] : no chills [Discharge] : no discharge [Pain] : no pain [Vision Problems] : no vision problems [Earache] : no earache [Sore Throat] : no sore throat [Chest Pain] : no chest pain [Palpitations] : no palpitations [Lower Ext Edema] : no lower extremity edema [Shortness Of Breath] : no shortness of breath [Wheezing] : no wheezing [Cough] : no cough [Abdominal Pain] : no abdominal pain [Vomiting] : no vomiting [Dysuria] : no dysuria [Incontinence] : no incontinence [Joint Pain] : no joint pain [Joint Stiffness] : no joint stiffness [Mole Changes] : no mole changes [Skin Rash] : no skin rash [Headache] : no headache [Dizziness] : no dizziness [Fainting] : no fainting [Confusion] : no confusion [Memory Loss] : no memory loss [Unsteady Walking] : no ataxia [Depression] : no depression [Easy Bleeding] : no easy bleeding [Easy Bruising] : no easy bruising

## 2021-09-15 LAB
25(OH)D3 SERPL-MCNC: 40 NG/ML
ALBUMIN SERPL ELPH-MCNC: 4.4 G/DL
ALP BLD-CCNC: 84 U/L
ALT SERPL-CCNC: 10 U/L
ANION GAP SERPL CALC-SCNC: 11 MMOL/L
APPEARANCE: CLEAR
AST SERPL-CCNC: 15 U/L
BACTERIA: NEGATIVE
BASOPHILS # BLD AUTO: 0.04 K/UL
BASOPHILS NFR BLD AUTO: 0.5 %
BILIRUB SERPL-MCNC: 0.3 MG/DL
BILIRUBIN URINE: NEGATIVE
BLOOD URINE: NEGATIVE
BUN SERPL-MCNC: 11 MG/DL
CALCIUM SERPL-MCNC: 9.8 MG/DL
CHLORIDE SERPL-SCNC: 96 MMOL/L
CHOLEST SERPL-MCNC: 224 MG/DL
CO2 SERPL-SCNC: 26 MMOL/L
COLOR: NORMAL
COVID-19 NUCLEOCAPSID  GAM ANTIBODY INTERPRETATION: NEGATIVE
CREAT SERPL-MCNC: 0.7 MG/DL
EOSINOPHIL # BLD AUTO: 0.17 K/UL
EOSINOPHIL NFR BLD AUTO: 2 %
ESTIMATED AVERAGE GLUCOSE: 120 MG/DL
GLUCOSE QUALITATIVE U: NEGATIVE
GLUCOSE SERPL-MCNC: 94 MG/DL
HBA1C MFR BLD HPLC: 5.8 %
HCT VFR BLD CALC: 38.9 %
HDLC SERPL-MCNC: 79 MG/DL
HGB BLD-MCNC: 13 G/DL
HYALINE CASTS: 0 /LPF
IMM GRANULOCYTES NFR BLD AUTO: 0.2 %
KETONES URINE: NEGATIVE
LDLC SERPL CALC-MCNC: 124 MG/DL
LEUKOCYTE ESTERASE URINE: NEGATIVE
LYMPHOCYTES # BLD AUTO: 2.47 K/UL
LYMPHOCYTES NFR BLD AUTO: 29.7 %
MAN DIFF?: NORMAL
MCHC RBC-ENTMCNC: 28.7 PG
MCHC RBC-ENTMCNC: 33.4 GM/DL
MCV RBC AUTO: 85.9 FL
MICROSCOPIC-UA: NORMAL
MONOCYTES # BLD AUTO: 0.8 K/UL
MONOCYTES NFR BLD AUTO: 9.6 %
NEUTROPHILS # BLD AUTO: 4.83 K/UL
NEUTROPHILS NFR BLD AUTO: 58 %
NITRITE URINE: NEGATIVE
NONHDLC SERPL-MCNC: 145 MG/DL
PH URINE: 5.5
PLATELET # BLD AUTO: 275 K/UL
POTASSIUM SERPL-SCNC: 3.9 MMOL/L
PROT SERPL-MCNC: 6.7 G/DL
PROTEIN URINE: NEGATIVE
RBC # BLD: 4.53 M/UL
RBC # FLD: 14 %
RED BLOOD CELLS URINE: 0 /HPF
SARS-COV-2 AB SERPL QL IA: 0.08 INDEX
SODIUM SERPL-SCNC: 133 MMOL/L
SPECIFIC GRAVITY URINE: 1
SQUAMOUS EPITHELIAL CELLS: 0 /HPF
TRIGL SERPL-MCNC: 105 MG/DL
TSH SERPL-ACNC: 5.4 UIU/ML
UROBILINOGEN URINE: NORMAL
WBC # FLD AUTO: 8.33 K/UL
WHITE BLOOD CELLS URINE: 0 /HPF

## 2021-11-23 ENCOUNTER — APPOINTMENT (OUTPATIENT)
Dept: INTERNAL MEDICINE | Facility: CLINIC | Age: 76
End: 2021-11-23
Payer: MEDICARE

## 2021-11-23 VITALS
WEIGHT: 166 LBS | OXYGEN SATURATION: 95 % | BODY MASS INDEX: 25.16 KG/M2 | TEMPERATURE: 97.8 F | HEIGHT: 68 IN | HEART RATE: 98 BPM

## 2021-11-23 VITALS — DIASTOLIC BLOOD PRESSURE: 82 MMHG | SYSTOLIC BLOOD PRESSURE: 132 MMHG

## 2021-11-23 DIAGNOSIS — J01.90 ACUTE SINUSITIS, UNSPECIFIED: ICD-10-CM

## 2021-11-23 PROCEDURE — 99213 OFFICE O/P EST LOW 20 MIN: CPT

## 2021-11-23 NOTE — HISTORY OF PRESENT ILLNESS
[de-identified] : Pt comes for bp check and renewal but developed a cough and slight wheeze and nasal congestion since yesterday  No fever

## 2021-11-23 NOTE — PHYSICAL EXAM
[No Acute Distress] : no acute distress [Normal Sclera/Conjunctiva] : normal sclera/conjunctiva [Normal Outer Ear/Nose] : the outer ears and nose were normal in appearance [No JVD] : no jugular venous distention [No Respiratory Distress] : no respiratory distress  [No Accessory Muscle Use] : no accessory muscle use [Normal Rate] : normal rate  [Regular Rhythm] : with a regular rhythm [No Edema] : there was no peripheral edema [No Extremity Clubbing/Cyanosis] : no extremity clubbing/cyanosis [Soft] : abdomen soft [Non Tender] : non-tender [de-identified] : post nasal drip [de-identified] : faint exp wheeze

## 2021-11-23 NOTE — REVIEW OF SYSTEMS
[Hoarseness] : hoarseness [Nasal Discharge] : nasal discharge [Postnasal Drip] : postnasal drip [Wheezing] : wheezing [Cough] : cough [Fever] : no fever [Chills] : no chills [Chest Pain] : no chest pain [Palpitations] : no palpitations [Lower Ext Edema] : no lower extremity edema [Shortness Of Breath] : no shortness of breath [Abdominal Pain] : no abdominal pain [Vomiting] : no vomiting [Dysuria] : no dysuria

## 2022-04-29 ENCOUNTER — NON-APPOINTMENT (OUTPATIENT)
Age: 77
End: 2022-04-29

## 2022-04-29 ENCOUNTER — APPOINTMENT (OUTPATIENT)
Dept: INTERNAL MEDICINE | Facility: CLINIC | Age: 77
End: 2022-04-29
Payer: MEDICARE

## 2022-04-29 VITALS
TEMPERATURE: 98 F | WEIGHT: 165 LBS | BODY MASS INDEX: 25.01 KG/M2 | HEIGHT: 68 IN | OXYGEN SATURATION: 99 % | HEART RATE: 70 BPM

## 2022-04-29 VITALS — DIASTOLIC BLOOD PRESSURE: 78 MMHG | SYSTOLIC BLOOD PRESSURE: 128 MMHG

## 2022-04-29 DIAGNOSIS — Z20.822 CONTACT WITH AND (SUSPECTED) EXPOSURE TO COVID-19: ICD-10-CM

## 2022-04-29 PROCEDURE — 93000 ELECTROCARDIOGRAM COMPLETE: CPT

## 2022-04-29 PROCEDURE — 36415 COLL VENOUS BLD VENIPUNCTURE: CPT

## 2022-04-29 PROCEDURE — 99214 OFFICE O/P EST MOD 30 MIN: CPT | Mod: 25

## 2022-04-29 NOTE — REVIEW OF SYSTEMS
[Joint Pain] : joint pain [Back Pain] : back pain [Fever] : no fever [Chills] : no chills [Fatigue] : no fatigue [Chest Pain] : no chest pain [Palpitations] : no palpitations [Lower Ext Edema] : no lower extremity edema [Shortness Of Breath] : no shortness of breath [Wheezing] : no wheezing [Cough] : no cough [Abdominal Pain] : no abdominal pain [Vomiting] : no vomiting [Joint Stiffness] : no joint stiffness

## 2022-04-29 NOTE — HISTORY OF PRESENT ILLNESS
[de-identified] : Pt comes for FBW and med renewal and EKG as she will be having colonoscopy soon  Pt may have had covid a few moths ago and took ivermectin and hydoxychlorquine for 2 days on her own and felt better

## 2022-04-29 NOTE — HEALTH RISK ASSESSMENT
[Never] : Never [No] : In the past 12 months have you used drugs other than those required for medical reasons? No [No falls in past year] : Patient reported no falls in the past year [0] : 2) Feeling down, depressed, or hopeless: Not at all (0) [PHQ-2 Negative - No further assessment needed] : PHQ-2 Negative - No further assessment needed [[id number]] : 0

## 2022-04-29 NOTE — PHYSICAL EXAM
[No Acute Distress] : no acute distress [Well Nourished] : well nourished [Normal Sclera/Conjunctiva] : normal sclera/conjunctiva [PERRL] : pupils equal round and reactive to light [Normal Outer Ear/Nose] : the outer ears and nose were normal in appearance [No JVD] : no jugular venous distention [No Lymphadenopathy] : no lymphadenopathy [No Respiratory Distress] : no respiratory distress  [No Accessory Muscle Use] : no accessory muscle use [Clear to Auscultation] : lungs were clear to auscultation bilaterally [Normal Rate] : normal rate  [Regular Rhythm] : with a regular rhythm [No Carotid Bruits] : no carotid bruits [No Edema] : there was no peripheral edema [No Extremity Clubbing/Cyanosis] : no extremity clubbing/cyanosis [Soft] : abdomen soft [Non Tender] : non-tender [Non-distended] : non-distended [No Masses] : no abdominal mass palpated [No Spinal Tenderness] : no spinal tenderness [Grossly Normal Strength/Tone] : grossly normal strength/tone [No Focal Deficits] : no focal deficits

## 2022-04-30 LAB
ALBUMIN SERPL ELPH-MCNC: 4.6 G/DL
ALP BLD-CCNC: 90 U/L
ALT SERPL-CCNC: 9 U/L
ANION GAP SERPL CALC-SCNC: 12 MMOL/L
AST SERPL-CCNC: 15 U/L
BASOPHILS # BLD AUTO: 0.03 K/UL
BASOPHILS NFR BLD AUTO: 0.5 %
BILIRUB SERPL-MCNC: 0.4 MG/DL
BUN SERPL-MCNC: 10 MG/DL
CALCIUM SERPL-MCNC: 9.8 MG/DL
CHLORIDE SERPL-SCNC: 98 MMOL/L
CHOLEST SERPL-MCNC: 221 MG/DL
CO2 SERPL-SCNC: 25 MMOL/L
COVID-19 NUCLEOCAPSID  GAM ANTIBODY INTERPRETATION: POSITIVE
CREAT SERPL-MCNC: 0.68 MG/DL
EGFR: 91 ML/MIN/1.73M2
EOSINOPHIL # BLD AUTO: 0.18 K/UL
EOSINOPHIL NFR BLD AUTO: 2.8 %
GLUCOSE SERPL-MCNC: 86 MG/DL
HCT VFR BLD CALC: 40.1 %
HDLC SERPL-MCNC: 86 MG/DL
HGB BLD-MCNC: 12.8 G/DL
IMM GRANULOCYTES NFR BLD AUTO: 0.2 %
LDLC SERPL CALC-MCNC: 123 MG/DL
LYMPHOCYTES # BLD AUTO: 2.13 K/UL
LYMPHOCYTES NFR BLD AUTO: 33.1 %
MAN DIFF?: NORMAL
MCHC RBC-ENTMCNC: 28.1 PG
MCHC RBC-ENTMCNC: 31.9 GM/DL
MCV RBC AUTO: 87.9 FL
MONOCYTES # BLD AUTO: 0.64 K/UL
MONOCYTES NFR BLD AUTO: 10 %
NEUTROPHILS # BLD AUTO: 3.44 K/UL
NEUTROPHILS NFR BLD AUTO: 53.4 %
NONHDLC SERPL-MCNC: 135 MG/DL
PLATELET # BLD AUTO: 253 K/UL
POTASSIUM SERPL-SCNC: 4.9 MMOL/L
PROT SERPL-MCNC: 6.6 G/DL
RBC # BLD: 4.56 M/UL
RBC # FLD: 13.9 %
SARS-COV-2 AB SERPL QL IA: 25 INDEX
SODIUM SERPL-SCNC: 135 MMOL/L
TRIGL SERPL-MCNC: 60 MG/DL
TSH SERPL-ACNC: 5.4 UIU/ML
WBC # FLD AUTO: 6.43 K/UL

## 2022-08-15 ENCOUNTER — APPOINTMENT (OUTPATIENT)
Dept: MAMMOGRAPHY | Facility: CLINIC | Age: 77
End: 2022-08-15

## 2022-08-15 ENCOUNTER — RESULT REVIEW (OUTPATIENT)
Age: 77
End: 2022-08-15

## 2022-08-15 ENCOUNTER — OUTPATIENT (OUTPATIENT)
Dept: OUTPATIENT SERVICES | Facility: HOSPITAL | Age: 77
LOS: 1 days | End: 2022-08-15
Payer: MEDICARE

## 2022-08-15 DIAGNOSIS — Z90.49 ACQUIRED ABSENCE OF OTHER SPECIFIED PARTS OF DIGESTIVE TRACT: Chronic | ICD-10-CM

## 2022-08-15 DIAGNOSIS — Z00.00 ENCOUNTER FOR GENERAL ADULT MEDICAL EXAMINATION WITHOUT ABNORMAL FINDINGS: ICD-10-CM

## 2022-08-15 DIAGNOSIS — Z98.890 OTHER SPECIFIED POSTPROCEDURAL STATES: Chronic | ICD-10-CM

## 2022-08-15 PROCEDURE — 77063 BREAST TOMOSYNTHESIS BI: CPT | Mod: 26

## 2022-08-15 PROCEDURE — 77063 BREAST TOMOSYNTHESIS BI: CPT

## 2022-08-15 PROCEDURE — 77067 SCR MAMMO BI INCL CAD: CPT | Mod: 26

## 2022-08-15 PROCEDURE — 77067 SCR MAMMO BI INCL CAD: CPT

## 2022-08-31 ENCOUNTER — APPOINTMENT (OUTPATIENT)
Dept: INTERNAL MEDICINE | Facility: CLINIC | Age: 77
End: 2022-08-31

## 2022-08-31 VITALS
HEIGHT: 68 IN | WEIGHT: 165 LBS | TEMPERATURE: 98.1 F | OXYGEN SATURATION: 93 % | BODY MASS INDEX: 25.01 KG/M2 | HEART RATE: 92 BPM | RESPIRATION RATE: 16 BRPM

## 2022-08-31 VITALS — DIASTOLIC BLOOD PRESSURE: 80 MMHG | SYSTOLIC BLOOD PRESSURE: 128 MMHG

## 2022-08-31 PROCEDURE — 99213 OFFICE O/P EST LOW 20 MIN: CPT | Mod: 25

## 2022-08-31 PROCEDURE — 36415 COLL VENOUS BLD VENIPUNCTURE: CPT

## 2022-08-31 NOTE — HISTORY OF PRESENT ILLNESS
[de-identified] : Pt comes for thyroid recheck  Feels it has been running lower after she stopped her OTC supplement

## 2022-09-05 LAB
ALBUMIN SERPL ELPH-MCNC: 4.4 G/DL
ALP BLD-CCNC: 89 U/L
ALT SERPL-CCNC: 11 U/L
ANION GAP SERPL CALC-SCNC: 10 MMOL/L
AST SERPL-CCNC: 14 U/L
BASOPHILS # BLD AUTO: 0.03 K/UL
BASOPHILS NFR BLD AUTO: 0.4 %
BILIRUB SERPL-MCNC: 0.4 MG/DL
BUN SERPL-MCNC: 11 MG/DL
CALCIUM SERPL-MCNC: 9.8 MG/DL
CHLORIDE SERPL-SCNC: 99 MMOL/L
CO2 SERPL-SCNC: 26 MMOL/L
CREAT SERPL-MCNC: 0.72 MG/DL
EGFR: 87 ML/MIN/1.73M2
EOSINOPHIL # BLD AUTO: 0.14 K/UL
EOSINOPHIL NFR BLD AUTO: 1.8 %
GLUCOSE SERPL-MCNC: 91 MG/DL
HCT VFR BLD CALC: 38.2 %
HGB BLD-MCNC: 12.7 G/DL
IMM GRANULOCYTES NFR BLD AUTO: 0.3 %
LYMPHOCYTES # BLD AUTO: 2.23 K/UL
LYMPHOCYTES NFR BLD AUTO: 28.5 %
MAN DIFF?: NORMAL
MCHC RBC-ENTMCNC: 28.4 PG
MCHC RBC-ENTMCNC: 33.2 GM/DL
MCV RBC AUTO: 85.5 FL
MONOCYTES # BLD AUTO: 0.58 K/UL
MONOCYTES NFR BLD AUTO: 7.4 %
NEUTROPHILS # BLD AUTO: 4.82 K/UL
NEUTROPHILS NFR BLD AUTO: 61.6 %
PLATELET # BLD AUTO: 250 K/UL
POTASSIUM SERPL-SCNC: 4.6 MMOL/L
PROT SERPL-MCNC: 6.6 G/DL
RBC # BLD: 4.47 M/UL
RBC # FLD: 14.2 %
SODIUM SERPL-SCNC: 135 MMOL/L
T3FREE SERPL-MCNC: 3 PG/ML
T4 FREE SERPL-MCNC: 1.3 NG/DL
TSH SERPL-ACNC: 3.47 UIU/ML
WBC # FLD AUTO: 7.82 K/UL

## 2022-09-06 ENCOUNTER — APPOINTMENT (OUTPATIENT)
Dept: INTERNAL MEDICINE | Facility: CLINIC | Age: 77
End: 2022-09-06

## 2022-10-23 ENCOUNTER — RX RENEWAL (OUTPATIENT)
Age: 77
End: 2022-10-23

## 2022-12-13 ENCOUNTER — APPOINTMENT (OUTPATIENT)
Dept: INTERNAL MEDICINE | Facility: CLINIC | Age: 77
End: 2022-12-13

## 2022-12-13 VITALS — WEIGHT: 165 LBS | BODY MASS INDEX: 25.01 KG/M2 | TEMPERATURE: 98.1 F | HEIGHT: 68 IN

## 2022-12-13 VITALS — DIASTOLIC BLOOD PRESSURE: 80 MMHG | SYSTOLIC BLOOD PRESSURE: 128 MMHG

## 2022-12-13 PROCEDURE — 99213 OFFICE O/P EST LOW 20 MIN: CPT

## 2022-12-13 RX ORDER — ALBUTEROL SULFATE 90 UG/1
108 (90 BASE) INHALANT RESPIRATORY (INHALATION) EVERY 6 HOURS
Qty: 1 | Refills: 2 | Status: ACTIVE | COMMUNITY
Start: 2019-09-03 | End: 1900-01-01

## 2022-12-13 NOTE — PHYSICAL EXAM
[No Acute Distress] : no acute distress [Normal Sclera/Conjunctiva] : normal sclera/conjunctiva [Normal Outer Ear/Nose] : the outer ears and nose were normal in appearance [Normal Oropharynx] : the oropharynx was normal [No JVD] : no jugular venous distention [No Lymphadenopathy] : no lymphadenopathy [Supple] : supple [No Respiratory Distress] : no respiratory distress  [No Accessory Muscle Use] : no accessory muscle use [Clear to Auscultation] : lungs were clear to auscultation bilaterally [Normal Rate] : normal rate  [Regular Rhythm] : with a regular rhythm [No Carotid Bruits] : no carotid bruits [No Edema] : there was no peripheral edema [No Extremity Clubbing/Cyanosis] : no extremity clubbing/cyanosis [Soft] : abdomen soft [Non Tender] : non-tender [Non-distended] : non-distended [No Masses] : no abdominal mass palpated [No Spinal Tenderness] : no spinal tenderness [Grossly Normal Strength/Tone] : grossly normal strength/tone

## 2023-03-13 ENCOUNTER — APPOINTMENT (OUTPATIENT)
Dept: INTERNAL MEDICINE | Facility: CLINIC | Age: 78
End: 2023-03-13
Payer: MEDICARE

## 2023-03-13 VITALS — BODY MASS INDEX: 25.01 KG/M2 | HEART RATE: 90 BPM | OXYGEN SATURATION: 95 % | WEIGHT: 165 LBS | HEIGHT: 68 IN

## 2023-03-13 VITALS — SYSTOLIC BLOOD PRESSURE: 128 MMHG | DIASTOLIC BLOOD PRESSURE: 80 MMHG

## 2023-03-13 DIAGNOSIS — J20.9 ACUTE BRONCHITIS, UNSPECIFIED: ICD-10-CM

## 2023-03-13 PROCEDURE — 99213 OFFICE O/P EST LOW 20 MIN: CPT

## 2023-03-13 NOTE — PHYSICAL EXAM
[No Acute Distress] : no acute distress [Normal Sclera/Conjunctiva] : normal sclera/conjunctiva [Normal Outer Ear/Nose] : the outer ears and nose were normal in appearance [No JVD] : no jugular venous distention [No Lymphadenopathy] : no lymphadenopathy [No Respiratory Distress] : no respiratory distress  [No Accessory Muscle Use] : no accessory muscle use [Normal Rate] : normal rate  [Regular Rhythm] : with a regular rhythm [No Edema] : there was no peripheral edema [No Extremity Clubbing/Cyanosis] : no extremity clubbing/cyanosis [Soft] : abdomen soft [Non Tender] : non-tender [de-identified] : harsh michaelle rhonchi with exp wheeze

## 2023-03-13 NOTE — HISTORY OF PRESENT ILLNESS
[de-identified] : Pt comes for 2 days of cough with harsh wheeze  Pt has been using nebulizer also  no fever no covid

## 2023-03-15 ENCOUNTER — RX RENEWAL (OUTPATIENT)
Age: 78
End: 2023-03-15

## 2023-07-28 ENCOUNTER — RX RENEWAL (OUTPATIENT)
Age: 78
End: 2023-07-28

## 2023-08-16 ENCOUNTER — APPOINTMENT (OUTPATIENT)
Dept: MAMMOGRAPHY | Facility: CLINIC | Age: 78
End: 2023-08-16
Payer: MEDICARE

## 2023-08-16 ENCOUNTER — RESULT REVIEW (OUTPATIENT)
Age: 78
End: 2023-08-16

## 2023-08-16 ENCOUNTER — OUTPATIENT (OUTPATIENT)
Dept: OUTPATIENT SERVICES | Facility: HOSPITAL | Age: 78
LOS: 1 days | End: 2023-08-16
Payer: COMMERCIAL

## 2023-08-16 DIAGNOSIS — Z98.890 OTHER SPECIFIED POSTPROCEDURAL STATES: Chronic | ICD-10-CM

## 2023-08-16 DIAGNOSIS — Z00.00 ENCOUNTER FOR GENERAL ADULT MEDICAL EXAMINATION WITHOUT ABNORMAL FINDINGS: ICD-10-CM

## 2023-08-16 DIAGNOSIS — Z90.49 ACQUIRED ABSENCE OF OTHER SPECIFIED PARTS OF DIGESTIVE TRACT: Chronic | ICD-10-CM

## 2023-08-16 PROCEDURE — 77063 BREAST TOMOSYNTHESIS BI: CPT | Mod: 26

## 2023-08-16 PROCEDURE — 77067 SCR MAMMO BI INCL CAD: CPT | Mod: 26

## 2023-08-16 PROCEDURE — 77063 BREAST TOMOSYNTHESIS BI: CPT

## 2023-08-16 PROCEDURE — 77067 SCR MAMMO BI INCL CAD: CPT

## 2023-09-27 ENCOUNTER — NON-APPOINTMENT (OUTPATIENT)
Age: 78
End: 2023-09-27

## 2023-10-02 ENCOUNTER — APPOINTMENT (OUTPATIENT)
Dept: INTERNAL MEDICINE | Facility: CLINIC | Age: 78
End: 2023-10-02
Payer: MEDICARE

## 2023-10-02 VITALS — SYSTOLIC BLOOD PRESSURE: 128 MMHG | DIASTOLIC BLOOD PRESSURE: 80 MMHG

## 2023-10-02 VITALS — HEART RATE: 74 BPM | HEIGHT: 68 IN | WEIGHT: 165 LBS | OXYGEN SATURATION: 96 % | BODY MASS INDEX: 25.01 KG/M2

## 2023-10-02 PROCEDURE — 99213 OFFICE O/P EST LOW 20 MIN: CPT

## 2023-10-02 RX ORDER — BUDESONIDE AND FORMOTEROL FUMARATE DIHYDRATE 160; 4.5 UG/1; UG/1
160-4.5 AEROSOL RESPIRATORY (INHALATION) TWICE DAILY
Qty: 3 | Refills: 1 | Status: ACTIVE | COMMUNITY
Start: 2020-03-06 | End: 1900-01-01

## 2023-10-17 ENCOUNTER — APPOINTMENT (OUTPATIENT)
Dept: INTERNAL MEDICINE | Facility: CLINIC | Age: 78
End: 2023-10-17
Payer: MEDICARE

## 2023-10-17 VITALS
WEIGHT: 165 LBS | HEART RATE: 80 BPM | OXYGEN SATURATION: 97 % | HEIGHT: 68 IN | RESPIRATION RATE: 16 BRPM | BODY MASS INDEX: 25.01 KG/M2

## 2023-10-17 VITALS — SYSTOLIC BLOOD PRESSURE: 130 MMHG | DIASTOLIC BLOOD PRESSURE: 80 MMHG

## 2023-10-17 PROCEDURE — 99213 OFFICE O/P EST LOW 20 MIN: CPT

## 2023-10-17 RX ORDER — PROMETHAZINE HYDROCHLORIDE 6.25 MG/5ML
6.25 SOLUTION ORAL
Qty: 90 | Refills: 1 | Status: ACTIVE | COMMUNITY
Start: 2023-10-17 | End: 1900-01-01

## 2023-10-17 RX ORDER — ALBUTEROL SULFATE 2.5 MG/3ML
(2.5 MG/3ML) SOLUTION RESPIRATORY (INHALATION) 4 TIMES DAILY
Qty: 1 | Refills: 3 | Status: ACTIVE | COMMUNITY
Start: 2023-10-17 | End: 1900-01-01

## 2023-10-19 ENCOUNTER — EMERGENCY (EMERGENCY)
Facility: HOSPITAL | Age: 78
LOS: 1 days | Discharge: HOME | End: 2023-10-19
Attending: STUDENT IN AN ORGANIZED HEALTH CARE EDUCATION/TRAINING PROGRAM
Payer: COMMERCIAL

## 2023-10-19 VITALS
TEMPERATURE: 98 F | RESPIRATION RATE: 18 BRPM | HEIGHT: 68 IN | DIASTOLIC BLOOD PRESSURE: 82 MMHG | HEART RATE: 98 BPM | OXYGEN SATURATION: 96 % | WEIGHT: 160.06 LBS | SYSTOLIC BLOOD PRESSURE: 126 MMHG

## 2023-10-19 VITALS
DIASTOLIC BLOOD PRESSURE: 79 MMHG | RESPIRATION RATE: 16 BRPM | TEMPERATURE: 98 F | OXYGEN SATURATION: 97 % | SYSTOLIC BLOOD PRESSURE: 121 MMHG | HEART RATE: 89 BPM

## 2023-10-19 DIAGNOSIS — Z90.49 ACQUIRED ABSENCE OF OTHER SPECIFIED PARTS OF DIGESTIVE TRACT: Chronic | ICD-10-CM

## 2023-10-19 DIAGNOSIS — Z98.890 OTHER SPECIFIED POSTPROCEDURAL STATES: Chronic | ICD-10-CM

## 2023-10-19 LAB
ALBUMIN SERPL ELPH-MCNC: 4 G/DL — SIGNIFICANT CHANGE UP (ref 3.3–5.2)
ALBUMIN SERPL ELPH-MCNC: 4 G/DL — SIGNIFICANT CHANGE UP (ref 3.3–5.2)
ALP SERPL-CCNC: 79 U/L — SIGNIFICANT CHANGE UP (ref 40–120)
ALP SERPL-CCNC: 79 U/L — SIGNIFICANT CHANGE UP (ref 40–120)
ALT FLD-CCNC: 13 U/L — SIGNIFICANT CHANGE UP
ALT FLD-CCNC: 13 U/L — SIGNIFICANT CHANGE UP
ANION GAP SERPL CALC-SCNC: 10 MMOL/L — SIGNIFICANT CHANGE UP (ref 5–17)
ANION GAP SERPL CALC-SCNC: 10 MMOL/L — SIGNIFICANT CHANGE UP (ref 5–17)
AST SERPL-CCNC: 16 U/L — SIGNIFICANT CHANGE UP
AST SERPL-CCNC: 16 U/L — SIGNIFICANT CHANGE UP
BASOPHILS # BLD AUTO: 0.03 K/UL — SIGNIFICANT CHANGE UP (ref 0–0.2)
BASOPHILS # BLD AUTO: 0.03 K/UL — SIGNIFICANT CHANGE UP (ref 0–0.2)
BASOPHILS NFR BLD AUTO: 0.3 % — SIGNIFICANT CHANGE UP (ref 0–2)
BASOPHILS NFR BLD AUTO: 0.3 % — SIGNIFICANT CHANGE UP (ref 0–2)
BILIRUB SERPL-MCNC: 0.3 MG/DL — LOW (ref 0.4–2)
BILIRUB SERPL-MCNC: 0.3 MG/DL — LOW (ref 0.4–2)
BUN SERPL-MCNC: 23.1 MG/DL — HIGH (ref 8–20)
BUN SERPL-MCNC: 23.1 MG/DL — HIGH (ref 8–20)
CALCIUM SERPL-MCNC: 9.4 MG/DL — SIGNIFICANT CHANGE UP (ref 8.4–10.5)
CALCIUM SERPL-MCNC: 9.4 MG/DL — SIGNIFICANT CHANGE UP (ref 8.4–10.5)
CHLORIDE SERPL-SCNC: 96 MMOL/L — SIGNIFICANT CHANGE UP (ref 96–108)
CHLORIDE SERPL-SCNC: 96 MMOL/L — SIGNIFICANT CHANGE UP (ref 96–108)
CO2 SERPL-SCNC: 27 MMOL/L — SIGNIFICANT CHANGE UP (ref 22–29)
CO2 SERPL-SCNC: 27 MMOL/L — SIGNIFICANT CHANGE UP (ref 22–29)
CREAT SERPL-MCNC: 0.71 MG/DL — SIGNIFICANT CHANGE UP (ref 0.5–1.3)
CREAT SERPL-MCNC: 0.71 MG/DL — SIGNIFICANT CHANGE UP (ref 0.5–1.3)
EGFR: 88 ML/MIN/1.73M2 — SIGNIFICANT CHANGE UP
EGFR: 88 ML/MIN/1.73M2 — SIGNIFICANT CHANGE UP
EOSINOPHIL # BLD AUTO: 0.14 K/UL — SIGNIFICANT CHANGE UP (ref 0–0.5)
EOSINOPHIL # BLD AUTO: 0.14 K/UL — SIGNIFICANT CHANGE UP (ref 0–0.5)
EOSINOPHIL NFR BLD AUTO: 1.3 % — SIGNIFICANT CHANGE UP (ref 0–6)
EOSINOPHIL NFR BLD AUTO: 1.3 % — SIGNIFICANT CHANGE UP (ref 0–6)
GLUCOSE SERPL-MCNC: 101 MG/DL — HIGH (ref 70–99)
GLUCOSE SERPL-MCNC: 101 MG/DL — HIGH (ref 70–99)
HCT VFR BLD CALC: 37 % — SIGNIFICANT CHANGE UP (ref 34.5–45)
HCT VFR BLD CALC: 37 % — SIGNIFICANT CHANGE UP (ref 34.5–45)
HGB BLD-MCNC: 12.6 G/DL — SIGNIFICANT CHANGE UP (ref 11.5–15.5)
HGB BLD-MCNC: 12.6 G/DL — SIGNIFICANT CHANGE UP (ref 11.5–15.5)
IMM GRANULOCYTES NFR BLD AUTO: 0.7 % — SIGNIFICANT CHANGE UP (ref 0–0.9)
IMM GRANULOCYTES NFR BLD AUTO: 0.7 % — SIGNIFICANT CHANGE UP (ref 0–0.9)
INR BLD: 1.08 RATIO — SIGNIFICANT CHANGE UP (ref 0.85–1.18)
INR BLD: 1.08 RATIO — SIGNIFICANT CHANGE UP (ref 0.85–1.18)
LIDOCAIN IGE QN: 26 U/L — SIGNIFICANT CHANGE UP (ref 22–51)
LIDOCAIN IGE QN: 26 U/L — SIGNIFICANT CHANGE UP (ref 22–51)
LYMPHOCYTES # BLD AUTO: 1.18 K/UL — SIGNIFICANT CHANGE UP (ref 1–3.3)
LYMPHOCYTES # BLD AUTO: 1.18 K/UL — SIGNIFICANT CHANGE UP (ref 1–3.3)
LYMPHOCYTES # BLD AUTO: 11.2 % — LOW (ref 13–44)
LYMPHOCYTES # BLD AUTO: 11.2 % — LOW (ref 13–44)
MCHC RBC-ENTMCNC: 28.5 PG — SIGNIFICANT CHANGE UP (ref 27–34)
MCHC RBC-ENTMCNC: 28.5 PG — SIGNIFICANT CHANGE UP (ref 27–34)
MCHC RBC-ENTMCNC: 34.1 GM/DL — SIGNIFICANT CHANGE UP (ref 32–36)
MCHC RBC-ENTMCNC: 34.1 GM/DL — SIGNIFICANT CHANGE UP (ref 32–36)
MCV RBC AUTO: 83.7 FL — SIGNIFICANT CHANGE UP (ref 80–100)
MCV RBC AUTO: 83.7 FL — SIGNIFICANT CHANGE UP (ref 80–100)
MONOCYTES # BLD AUTO: 0.46 K/UL — SIGNIFICANT CHANGE UP (ref 0–0.9)
MONOCYTES # BLD AUTO: 0.46 K/UL — SIGNIFICANT CHANGE UP (ref 0–0.9)
MONOCYTES NFR BLD AUTO: 4.4 % — SIGNIFICANT CHANGE UP (ref 2–14)
MONOCYTES NFR BLD AUTO: 4.4 % — SIGNIFICANT CHANGE UP (ref 2–14)
NEUTROPHILS # BLD AUTO: 8.62 K/UL — HIGH (ref 1.8–7.4)
NEUTROPHILS # BLD AUTO: 8.62 K/UL — HIGH (ref 1.8–7.4)
NEUTROPHILS NFR BLD AUTO: 82.1 % — HIGH (ref 43–77)
NEUTROPHILS NFR BLD AUTO: 82.1 % — HIGH (ref 43–77)
PLATELET # BLD AUTO: 266 K/UL — SIGNIFICANT CHANGE UP (ref 150–400)
PLATELET # BLD AUTO: 266 K/UL — SIGNIFICANT CHANGE UP (ref 150–400)
POTASSIUM SERPL-MCNC: 3.3 MMOL/L — LOW (ref 3.5–5.3)
POTASSIUM SERPL-MCNC: 3.3 MMOL/L — LOW (ref 3.5–5.3)
POTASSIUM SERPL-SCNC: 3.3 MMOL/L — LOW (ref 3.5–5.3)
POTASSIUM SERPL-SCNC: 3.3 MMOL/L — LOW (ref 3.5–5.3)
PROT SERPL-MCNC: 7 G/DL — SIGNIFICANT CHANGE UP (ref 6.6–8.7)
PROT SERPL-MCNC: 7 G/DL — SIGNIFICANT CHANGE UP (ref 6.6–8.7)
PROTHROM AB SERPL-ACNC: 12 SEC — SIGNIFICANT CHANGE UP (ref 9.5–13)
PROTHROM AB SERPL-ACNC: 12 SEC — SIGNIFICANT CHANGE UP (ref 9.5–13)
RBC # BLD: 4.42 M/UL — SIGNIFICANT CHANGE UP (ref 3.8–5.2)
RBC # BLD: 4.42 M/UL — SIGNIFICANT CHANGE UP (ref 3.8–5.2)
RBC # FLD: 13.3 % — SIGNIFICANT CHANGE UP (ref 10.3–14.5)
RBC # FLD: 13.3 % — SIGNIFICANT CHANGE UP (ref 10.3–14.5)
SODIUM SERPL-SCNC: 133 MMOL/L — LOW (ref 135–145)
SODIUM SERPL-SCNC: 133 MMOL/L — LOW (ref 135–145)
TROPONIN T SERPL-MCNC: <0.01 NG/ML — SIGNIFICANT CHANGE UP (ref 0–0.06)
TROPONIN T SERPL-MCNC: <0.01 NG/ML — SIGNIFICANT CHANGE UP (ref 0–0.06)
WBC # BLD: 10.5 K/UL — SIGNIFICANT CHANGE UP (ref 3.8–10.5)
WBC # BLD: 10.5 K/UL — SIGNIFICANT CHANGE UP (ref 3.8–10.5)
WBC # FLD AUTO: 10.5 K/UL — SIGNIFICANT CHANGE UP (ref 3.8–10.5)
WBC # FLD AUTO: 10.5 K/UL — SIGNIFICANT CHANGE UP (ref 3.8–10.5)

## 2023-10-19 PROCEDURE — 99285 EMERGENCY DEPT VISIT HI MDM: CPT | Mod: 25

## 2023-10-19 PROCEDURE — 71045 X-RAY EXAM CHEST 1 VIEW: CPT

## 2023-10-19 PROCEDURE — 85730 THROMBOPLASTIN TIME PARTIAL: CPT

## 2023-10-19 PROCEDURE — 74177 CT ABD & PELVIS W/CONTRAST: CPT | Mod: MA

## 2023-10-19 PROCEDURE — 71045 X-RAY EXAM CHEST 1 VIEW: CPT | Mod: 26

## 2023-10-19 PROCEDURE — 71260 CT THORAX DX C+: CPT | Mod: MA

## 2023-10-19 PROCEDURE — 96374 THER/PROPH/DIAG INJ IV PUSH: CPT | Mod: XU

## 2023-10-19 PROCEDURE — 85610 PROTHROMBIN TIME: CPT

## 2023-10-19 PROCEDURE — 85025 COMPLETE CBC W/AUTO DIFF WBC: CPT

## 2023-10-19 PROCEDURE — 93005 ELECTROCARDIOGRAM TRACING: CPT

## 2023-10-19 PROCEDURE — 80053 COMPREHEN METABOLIC PANEL: CPT

## 2023-10-19 PROCEDURE — 74177 CT ABD & PELVIS W/CONTRAST: CPT | Mod: 26,MA

## 2023-10-19 PROCEDURE — 93010 ELECTROCARDIOGRAM REPORT: CPT

## 2023-10-19 PROCEDURE — 99285 EMERGENCY DEPT VISIT HI MDM: CPT

## 2023-10-19 PROCEDURE — 83690 ASSAY OF LIPASE: CPT

## 2023-10-19 PROCEDURE — 84484 ASSAY OF TROPONIN QUANT: CPT

## 2023-10-19 PROCEDURE — 71260 CT THORAX DX C+: CPT | Mod: 26,MA

## 2023-10-19 PROCEDURE — 36415 COLL VENOUS BLD VENIPUNCTURE: CPT

## 2023-10-19 RX ORDER — POTASSIUM CHLORIDE 20 MEQ
10 PACKET (EA) ORAL ONCE
Refills: 0 | Status: COMPLETED | OUTPATIENT
Start: 2023-10-19 | End: 2023-10-19

## 2023-10-19 RX ORDER — ACETAMINOPHEN 500 MG
975 TABLET ORAL ONCE
Refills: 0 | Status: COMPLETED | OUTPATIENT
Start: 2023-10-19 | End: 2023-10-19

## 2023-10-19 RX ADMIN — Medication 100 MILLIEQUIVALENT(S): at 15:33

## 2023-10-19 RX ADMIN — Medication 975 MILLIGRAM(S): at 15:31
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
## 2023-10-19 NOTE — ED PROVIDER NOTE - PATIENT PORTAL LINK FT
You can access the FollowMyHealth Patient Portal offered by [facility] by registering at the following website: [email or web address]. By joining [facility]’s FollowMyHealth portal, you will also be able to view your health information using other applications (apps) compatible with our system.

## 2023-10-19 NOTE — ED ADULT NURSE NOTE - NSFALLHARMRISKINTERV_ED_ALL_ED
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
## 2023-10-19 NOTE — ED PROVIDER NOTE - NS ED ROS FT
Constitutional:  See HPI  Eyes:  No visual changes  ENMT: No neck pain or stiffness  Cardiac:  (+) chest pain  Respiratory:  No cough or respiratory distress. (+) chest discomfort with deep respirations   GI:  No nausea, vomiting, diarrhea or abdominal pain.  MS:  No back pain.  Neuro:  No headache   Skin:  No skin rash    Except as documented in the HPI,  all other systems are negative

## 2023-10-19 NOTE — ED ADULT TRIAGE NOTE - ACCOMPANIED BY
PC to pt [facility], [phone]  Advised her of need for pt to undergo IR drainage of abdominal ascites.  She is agreeable for patient to proceed with procedure.  Dr. Carlton notified. EMT/paramedic
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
## 2023-10-19 NOTE — ED ADULT TRIAGE NOTE - MODE OF ARRIVAL
[facility]ulance [facility] [place] Counseling:  I discussed with the patient the risks of etanercept including but not limited to myelosuppression, immunosuppression, autoimmune hepatitis, demyelinating diseases, lymphoma, and infections.  The patient understands that monitoring is required including a PPD at baseline and must alert us or the primary physician if symptoms of infection or other concerning signs are noted.
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
## 2023-10-19 NOTE — ED PROVIDER NOTE - PHYSICAL EXAMINATION
Gen: AAOx3, non-toxic well appearing female in no acute distress  Head: NCAT  HEENT: EOMI, oral mucosa moist, normal conjunctiva  Lung: CTAB, no respiratory distress, no wheezes/rhonchi/rales B/L, speaking in full sentences.   CV: RRR, no murmurs, rubs or gallops. Significant tenderness over sternum and right chest wall. No visible bruising.   Abd: soft, NTND, no guarding, no CVA tenderness. No visible bruising.   MSK: b/l LE shin abrasions.   Neuro: No focal sensory or motor deficits  Skin: Warm, well perfused, no rash  Psych: normal affect.     ~Sadia Chandler, MS4

## 2023-11-03 ENCOUNTER — APPOINTMENT (OUTPATIENT)
Dept: INTERNAL MEDICINE | Facility: CLINIC | Age: 78
End: 2023-11-03
Payer: COMMERCIAL

## 2023-11-03 VITALS — HEIGHT: 68 IN | BODY MASS INDEX: 25.01 KG/M2 | HEART RATE: 71 BPM | WEIGHT: 165 LBS | OXYGEN SATURATION: 92 %

## 2023-11-03 VITALS — SYSTOLIC BLOOD PRESSURE: 130 MMHG | DIASTOLIC BLOOD PRESSURE: 82 MMHG

## 2023-11-03 DIAGNOSIS — R42 DIZZINESS AND GIDDINESS: ICD-10-CM
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
## 2023-11-03 PROCEDURE — 99214 OFFICE O/P EST MOD 30 MIN: CPT

## 2023-11-03 RX ORDER — PREDNISONE 10 MG/1
10 TABLET ORAL
Qty: 9 | Refills: 0 | Status: ACTIVE | COMMUNITY
Start: 2021-11-23 | End: 1900-01-01

## 2023-11-06 ENCOUNTER — APPOINTMENT (OUTPATIENT)
Dept: CT IMAGING | Facility: CLINIC | Age: 78
End: 2023-11-06
Payer: COMMERCIAL

## 2023-11-06 ENCOUNTER — OUTPATIENT (OUTPATIENT)
Dept: OUTPATIENT SERVICES | Facility: HOSPITAL | Age: 78
LOS: 1 days | End: 2023-11-06
Payer: COMMERCIAL
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
## 2023-11-06 DIAGNOSIS — R42 DIZZINESS AND GIDDINESS: ICD-10-CM

## 2023-11-06 DIAGNOSIS — Z98.890 OTHER SPECIFIED POSTPROCEDURAL STATES: Chronic | ICD-10-CM

## 2023-11-06 PROCEDURE — 70450 CT HEAD/BRAIN W/O DYE: CPT

## 2023-11-06 PROCEDURE — 70450 CT HEAD/BRAIN W/O DYE: CPT | Mod: 26

## 2023-11-07 ENCOUNTER — NON-APPOINTMENT (OUTPATIENT)
Age: 78
End: 2023-11-07

## 2023-12-22 DIAGNOSIS — R42 DIZZINESS AND GIDDINESS: ICD-10-CM

## 2024-02-08 DIAGNOSIS — M25.551 PAIN IN RIGHT HIP: ICD-10-CM

## 2024-03-12 RX ORDER — MELOXICAM 15 MG/1
15 TABLET ORAL
Qty: 30 | Refills: 1 | Status: DISCONTINUED | COMMUNITY
Start: 2021-01-29 | End: 2024-03-12

## 2024-03-12 RX ORDER — BUDESONIDE 0.5 MG/2ML
0.5 INHALANT ORAL TWICE DAILY
Qty: 60 | Refills: 2 | Status: DISCONTINUED | COMMUNITY
Start: 2020-03-14 | End: 2024-03-12

## 2024-03-13 ENCOUNTER — APPOINTMENT (OUTPATIENT)
Dept: INTERNAL MEDICINE | Facility: CLINIC | Age: 79
End: 2024-03-13
Payer: MEDICARE

## 2024-03-13 VITALS — DIASTOLIC BLOOD PRESSURE: 82 MMHG | SYSTOLIC BLOOD PRESSURE: 130 MMHG

## 2024-03-13 VITALS — BODY MASS INDEX: 24.86 KG/M2 | HEART RATE: 53 BPM | OXYGEN SATURATION: 96 % | WEIGHT: 164 LBS | HEIGHT: 68 IN

## 2024-03-13 PROCEDURE — 36415 COLL VENOUS BLD VENIPUNCTURE: CPT

## 2024-03-13 PROCEDURE — 99214 OFFICE O/P EST MOD 30 MIN: CPT

## 2024-03-13 PROCEDURE — G2211 COMPLEX E/M VISIT ADD ON: CPT

## 2024-03-13 NOTE — HEALTH RISK ASSESSMENT
[0] : 2) Feeling down, depressed, or hopeless: Not at all (0) [PHQ-2 Negative - No further assessment needed] : PHQ-2 Negative - No further assessment needed [[id number]] : 0 [Never] : Never

## 2024-03-13 NOTE — HISTORY OF PRESENT ILLNESS
[de-identified] : Pt comes for bp check and med renewal  Saw Dr Delgadillo for wheezing  Using inhaler

## 2024-03-13 NOTE — PHYSICAL EXAM
[No Acute Distress] : no acute distress [Normal Sclera/Conjunctiva] : normal sclera/conjunctiva [Normal Outer Ear/Nose] : the outer ears and nose were normal in appearance [No JVD] : no jugular venous distention [No Respiratory Distress] : no respiratory distress  [No Accessory Muscle Use] : no accessory muscle use [Clear to Auscultation] : lungs were clear to auscultation bilaterally [Normal Rate] : normal rate  [Regular Rhythm] : with a regular rhythm [No Edema] : there was no peripheral edema [No Extremity Clubbing/Cyanosis] : no extremity clubbing/cyanosis [Soft] : abdomen soft [Non Tender] : non-tender [Non-distended] : non-distended [Normal Affect] : the affect was normal [Normal Insight/Judgement] : insight and judgment were intact [de-identified] : faint wheez

## 2024-03-14 ENCOUNTER — NON-APPOINTMENT (OUTPATIENT)
Age: 79
End: 2024-03-14

## 2024-03-14 LAB
ALBUMIN SERPL ELPH-MCNC: 4.3 G/DL
ALP BLD-CCNC: 77 U/L
ALT SERPL-CCNC: 14 U/L
ANION GAP SERPL CALC-SCNC: 11 MMOL/L
AST SERPL-CCNC: 14 U/L
BASOPHILS # BLD AUTO: 0.07 K/UL
BASOPHILS NFR BLD AUTO: 0.8 %
BILIRUB SERPL-MCNC: 0.3 MG/DL
BUN SERPL-MCNC: 14 MG/DL
CALCIUM SERPL-MCNC: 9.5 MG/DL
CHLORIDE SERPL-SCNC: 97 MMOL/L
CHOLEST SERPL-MCNC: 216 MG/DL
CO2 SERPL-SCNC: 25 MMOL/L
CREAT SERPL-MCNC: 0.69 MG/DL
EGFR: 89 ML/MIN/1.73M2
EOSINOPHIL # BLD AUTO: 0.29 K/UL
EOSINOPHIL NFR BLD AUTO: 3.4 %
GLUCOSE SERPL-MCNC: 101 MG/DL
HCT VFR BLD CALC: 40.6 %
HDLC SERPL-MCNC: 104 MG/DL
HGB BLD-MCNC: 12.9 G/DL
IMM GRANULOCYTES NFR BLD AUTO: 0.2 %
LDLC SERPL CALC-MCNC: 96 MG/DL
LYMPHOCYTES # BLD AUTO: 2.58 K/UL
LYMPHOCYTES NFR BLD AUTO: 30.6 %
MAN DIFF?: NORMAL
MCHC RBC-ENTMCNC: 27.2 PG
MCHC RBC-ENTMCNC: 31.8 GM/DL
MCV RBC AUTO: 85.7 FL
MONOCYTES # BLD AUTO: 0.9 K/UL
MONOCYTES NFR BLD AUTO: 10.7 %
NEUTROPHILS # BLD AUTO: 4.57 K/UL
NEUTROPHILS NFR BLD AUTO: 54.3 %
NONHDLC SERPL-MCNC: 112 MG/DL
PLATELET # BLD AUTO: 268 K/UL
POTASSIUM SERPL-SCNC: 4.3 MMOL/L
PROT SERPL-MCNC: 6.7 G/DL
RBC # BLD: 4.74 M/UL
RBC # FLD: 14.3 %
SODIUM SERPL-SCNC: 133 MMOL/L
TRIGL SERPL-MCNC: 95 MG/DL
TSH SERPL-ACNC: 6.45 UIU/ML
WBC # FLD AUTO: 8.43 K/UL

## 2024-05-13 ENCOUNTER — NON-APPOINTMENT (OUTPATIENT)
Age: 79
End: 2024-05-13

## 2024-05-16 DIAGNOSIS — R53.83 OTHER FATIGUE: ICD-10-CM

## 2024-05-16 DIAGNOSIS — J45.909 UNSPECIFIED ASTHMA, UNCOMPLICATED: ICD-10-CM

## 2024-06-06 RX ORDER — DICLOFENAC SODIUM 75 MG/1
75 TABLET, DELAYED RELEASE ORAL
Qty: 20 | Refills: 0 | Status: DISCONTINUED | COMMUNITY
Start: 2020-12-14 | End: 2024-06-06

## 2024-06-06 RX ORDER — AZITHROMYCIN 250 MG/1
250 TABLET, FILM COATED ORAL
Qty: 1 | Refills: 0 | Status: DISCONTINUED | COMMUNITY
Start: 2021-11-23 | End: 2024-06-06

## 2024-06-06 RX ORDER — FLUTICASONE PROPIONATE 50 UG/1
50 SPRAY, METERED NASAL DAILY
Qty: 1 | Refills: 2 | Status: DISCONTINUED | COMMUNITY
Start: 2021-11-23 | End: 2024-06-06

## 2024-06-07 ENCOUNTER — APPOINTMENT (OUTPATIENT)
Dept: INTERNAL MEDICINE | Facility: CLINIC | Age: 79
End: 2024-06-07
Payer: MEDICARE

## 2024-06-07 VITALS — HEIGHT: 68 IN | HEART RATE: 94 BPM | BODY MASS INDEX: 24.86 KG/M2 | WEIGHT: 164 LBS | OXYGEN SATURATION: 96 %

## 2024-06-07 VITALS — SYSTOLIC BLOOD PRESSURE: 128 MMHG | DIASTOLIC BLOOD PRESSURE: 78 MMHG

## 2024-06-07 DIAGNOSIS — R79.89 OTHER SPECIFIED ABNORMAL FINDINGS OF BLOOD CHEMISTRY: ICD-10-CM

## 2024-06-07 DIAGNOSIS — E03.9 HYPOTHYROIDISM, UNSPECIFIED: ICD-10-CM

## 2024-06-07 DIAGNOSIS — I10 ESSENTIAL (PRIMARY) HYPERTENSION: ICD-10-CM

## 2024-06-07 PROCEDURE — 99214 OFFICE O/P EST MOD 30 MIN: CPT

## 2024-06-07 PROCEDURE — G2211 COMPLEX E/M VISIT ADD ON: CPT

## 2024-06-07 RX ORDER — OLMESARTAN MEDOXOMIL AND HYDROCHLOROTHIAZIDE 20; 12.5 MG/1; MG/1
20-12.5 TABLET ORAL
Qty: 90 | Refills: 0 | Status: ACTIVE | COMMUNITY
Start: 2019-09-03 | End: 1900-01-01

## 2024-06-07 NOTE — REVIEW OF SYSTEMS
[Joint Stiffness] : joint stiffness [Back Pain] : back pain [Fever] : no fever [Chills] : no chills [Chest Pain] : no chest pain [Palpitations] : no palpitations [Lower Ext Edema] : no lower extremity edema [Shortness Of Breath] : no shortness of breath [Wheezing] : no wheezing [Cough] : no cough [Abdominal Pain] : no abdominal pain [Joint Pain] : no joint pain [Anxiety] : no anxiety [Depression] : no depression

## 2024-06-07 NOTE — PHYSICAL EXAM
[No Acute Distress] : no acute distress [Normal Sclera/Conjunctiva] : normal sclera/conjunctiva [Normal Outer Ear/Nose] : the outer ears and nose were normal in appearance [No JVD] : no jugular venous distention [No Lymphadenopathy] : no lymphadenopathy [No Respiratory Distress] : no respiratory distress  [No Accessory Muscle Use] : no accessory muscle use [Clear to Auscultation] : lungs were clear to auscultation bilaterally [Normal Rate] : normal rate  [Regular Rhythm] : with a regular rhythm [No Carotid Bruits] : no carotid bruits [No Edema] : there was no peripheral edema [Soft] : abdomen soft [Non Tender] : non-tender [Non-distended] : non-distended [Grossly Normal Strength/Tone] : grossly normal strength/tone

## 2024-06-07 NOTE — HEALTH RISK ASSESSMENT
[0] : 2) Feeling down, depressed, or hopeless: Not at all (0) [PHQ-2 Negative - No further assessment needed] : PHQ-2 Negative - No further assessment needed [Never] : Never [[id number]] : 0
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
## 2024-08-01 VITALS
SYSTOLIC BLOOD PRESSURE: 109 MMHG | RESPIRATION RATE: 17 BRPM | HEIGHT: 68 IN | DIASTOLIC BLOOD PRESSURE: 73 MMHG | WEIGHT: 165 LBS | HEART RATE: 70 BPM | TEMPERATURE: 95.4 F | BODY MASS INDEX: 25.01 KG/M2 | OXYGEN SATURATION: 96 %

## 2024-08-17 ENCOUNTER — APPOINTMENT (OUTPATIENT)
Dept: MAMMOGRAPHY | Facility: CLINIC | Age: 79
End: 2024-08-17
Payer: MEDICARE

## 2024-08-17 ENCOUNTER — RESULT REVIEW (OUTPATIENT)
Age: 79
End: 2024-08-17

## 2024-08-17 PROCEDURE — 77063 BREAST TOMOSYNTHESIS BI: CPT | Mod: 26

## 2024-08-17 PROCEDURE — 77067 SCR MAMMO BI INCL CAD: CPT | Mod: 26

## 2024-08-19 DIAGNOSIS — M54.2 CERVICALGIA: ICD-10-CM

## 2024-08-19 DIAGNOSIS — M25.551 PAIN IN RIGHT HIP: ICD-10-CM

## 2024-08-19 DIAGNOSIS — R42 DIZZINESS AND GIDDINESS: ICD-10-CM

## 2024-08-28 ENCOUNTER — NON-APPOINTMENT (OUTPATIENT)
Age: 79
End: 2024-08-28

## 2024-08-28 DIAGNOSIS — R91.1 SOLITARY PULMONARY NODULE: ICD-10-CM

## 2024-08-28 DIAGNOSIS — J42 UNSPECIFIED CHRONIC BRONCHITIS: ICD-10-CM

## 2024-08-28 DIAGNOSIS — R06.00 DYSPNEA, UNSPECIFIED: ICD-10-CM

## 2024-08-28 DIAGNOSIS — S27.329A CONTUSION OF LUNG, UNSPECIFIED, INITIAL ENCOUNTER: ICD-10-CM

## 2024-08-28 RX ORDER — BUDESONIDE, GLYCOPYRROLATE, AND FORMOTEROL FUMARATE 160; 9; 4.8 UG/1; UG/1; UG/1
160-9-4.8 AEROSOL, METERED RESPIRATORY (INHALATION)
Refills: 0 | Status: ACTIVE | COMMUNITY

## 2024-08-30 ENCOUNTER — RX RENEWAL (OUTPATIENT)
Age: 79
End: 2024-08-30

## 2024-09-16 ENCOUNTER — APPOINTMENT (OUTPATIENT)
Dept: INTERNAL MEDICINE | Facility: CLINIC | Age: 79
End: 2024-09-16
Payer: MEDICARE

## 2024-09-16 VITALS — HEART RATE: 77 BPM | OXYGEN SATURATION: 93 % | BODY MASS INDEX: 25.01 KG/M2 | WEIGHT: 165 LBS | HEIGHT: 68 IN

## 2024-09-16 VITALS — SYSTOLIC BLOOD PRESSURE: 128 MMHG | DIASTOLIC BLOOD PRESSURE: 80 MMHG

## 2024-09-16 DIAGNOSIS — E03.9 HYPOTHYROIDISM, UNSPECIFIED: ICD-10-CM

## 2024-09-16 DIAGNOSIS — J45.909 UNSPECIFIED ASTHMA, UNCOMPLICATED: ICD-10-CM

## 2024-09-16 DIAGNOSIS — R79.89 OTHER SPECIFIED ABNORMAL FINDINGS OF BLOOD CHEMISTRY: ICD-10-CM

## 2024-09-16 DIAGNOSIS — I10 ESSENTIAL (PRIMARY) HYPERTENSION: ICD-10-CM

## 2024-09-16 PROCEDURE — 99214 OFFICE O/P EST MOD 30 MIN: CPT

## 2024-09-16 PROCEDURE — 36415 COLL VENOUS BLD VENIPUNCTURE: CPT

## 2024-09-16 PROCEDURE — G2211 COMPLEX E/M VISIT ADD ON: CPT

## 2024-09-16 RX ORDER — MELOXICAM 15 MG/1
15 TABLET ORAL
Qty: 30 | Refills: 1 | Status: ACTIVE | COMMUNITY
Start: 2024-09-16 | End: 1900-01-01

## 2024-09-16 NOTE — HISTORY OF PRESENT ILLNESS
[de-identified] : Pt comes for FBW and med renewal  Pt has been hving some left shoulder arthritis pain especially when sleeping on that shoulder

## 2024-09-16 NOTE — PLAN
[FreeTextEntry1] : med renewed  labs performed  advised that if left shoulder persisit she would benefit from xray and possible cortisone injection

## 2024-09-16 NOTE — REVIEW OF SYSTEMS
[Joint Pain] : joint pain [Joint Stiffness] : joint stiffness [Fever] : no fever [Chills] : no chills [Discharge] : no discharge [Earache] : no earache [Sore Throat] : no sore throat [Chest Pain] : no chest pain [Palpitations] : no palpitations [Lower Ext Edema] : no lower extremity edema [Shortness Of Breath] : no shortness of breath [Wheezing] : no wheezing [Cough] : no cough [Abdominal Pain] : no abdominal pain [Joint Swelling] : no joint swelling

## 2024-09-16 NOTE — PHYSICAL EXAM
[No Acute Distress] : no acute distress [Normal Sclera/Conjunctiva] : normal sclera/conjunctiva [Normal Outer Ear/Nose] : the outer ears and nose were normal in appearance [No JVD] : no jugular venous distention [No Lymphadenopathy] : no lymphadenopathy [No Respiratory Distress] : no respiratory distress  [No Accessory Muscle Use] : no accessory muscle use [Clear to Auscultation] : lungs were clear to auscultation bilaterally [Normal Rate] : normal rate  [Regular Rhythm] : with a regular rhythm [No Carotid Bruits] : no carotid bruits [No Edema] : there was no peripheral edema [No Extremity Clubbing/Cyanosis] : no extremity clubbing/cyanosis [Soft] : abdomen soft [Non Tender] : non-tender [Non-distended] : non-distended [No Masses] : no abdominal mass palpated [No Spinal Tenderness] : no spinal tenderness [de-identified] : decreased ROM left shoulder

## 2024-09-17 LAB — 25(OH)D3 SERPL-MCNC: 47.2 NG/ML

## 2024-10-08 ENCOUNTER — APPOINTMENT (OUTPATIENT)
Facility: CLINIC | Age: 79
End: 2024-10-08

## 2024-11-12 ENCOUNTER — RX RENEWAL (OUTPATIENT)
Age: 79
End: 2024-11-12

## 2024-11-25 ENCOUNTER — RX RENEWAL (OUTPATIENT)
Age: 79
End: 2024-11-25

## 2025-04-09 ENCOUNTER — APPOINTMENT (OUTPATIENT)
Dept: INTERNAL MEDICINE | Facility: CLINIC | Age: 80
End: 2025-04-09
Payer: MEDICARE

## 2025-04-09 VITALS — OXYGEN SATURATION: 95 % | HEIGHT: 68 IN | BODY MASS INDEX: 25.01 KG/M2 | WEIGHT: 165 LBS | HEART RATE: 77 BPM

## 2025-04-09 VITALS — SYSTOLIC BLOOD PRESSURE: 128 MMHG | DIASTOLIC BLOOD PRESSURE: 80 MMHG

## 2025-04-09 DIAGNOSIS — I10 ESSENTIAL (PRIMARY) HYPERTENSION: ICD-10-CM

## 2025-04-09 PROCEDURE — 99213 OFFICE O/P EST LOW 20 MIN: CPT

## 2025-04-09 PROCEDURE — G2211 COMPLEX E/M VISIT ADD ON: CPT

## 2025-06-27 ENCOUNTER — APPOINTMENT (OUTPATIENT)
Dept: INTERNAL MEDICINE | Facility: CLINIC | Age: 80
End: 2025-06-27
Payer: MEDICARE

## 2025-06-27 VITALS — HEIGHT: 68 IN | HEART RATE: 82 BPM | WEIGHT: 165 LBS | BODY MASS INDEX: 25.01 KG/M2 | OXYGEN SATURATION: 97 %

## 2025-06-27 VITALS — SYSTOLIC BLOOD PRESSURE: 128 MMHG | DIASTOLIC BLOOD PRESSURE: 78 MMHG

## 2025-06-27 PROBLEM — K21.9 GERD (GASTROESOPHAGEAL REFLUX DISEASE): Status: ACTIVE | Noted: 2025-06-27

## 2025-06-27 PROCEDURE — G2211 COMPLEX E/M VISIT ADD ON: CPT

## 2025-06-27 PROCEDURE — 99213 OFFICE O/P EST LOW 20 MIN: CPT

## 2025-06-27 RX ORDER — MONTELUKAST 10 MG/1
10 TABLET, FILM COATED ORAL
Qty: 30 | Refills: 3 | Status: ACTIVE | COMMUNITY
Start: 2025-06-27

## 2025-06-27 RX ORDER — OMEPRAZOLE 40 MG/1
40 CAPSULE, DELAYED RELEASE ORAL
Qty: 30 | Refills: 1 | Status: ACTIVE | COMMUNITY
Start: 2025-06-27

## 2025-08-18 ENCOUNTER — APPOINTMENT (OUTPATIENT)
Dept: MAMMOGRAPHY | Facility: CLINIC | Age: 80
End: 2025-08-18
Payer: MEDICARE

## 2025-08-18 ENCOUNTER — RESULT REVIEW (OUTPATIENT)
Age: 80
End: 2025-08-18

## 2025-08-18 ENCOUNTER — OUTPATIENT (OUTPATIENT)
Dept: OUTPATIENT SERVICES | Facility: HOSPITAL | Age: 80
LOS: 1 days | End: 2025-08-18
Payer: MEDICARE

## 2025-08-18 DIAGNOSIS — Z98.890 OTHER SPECIFIED POSTPROCEDURAL STATES: Chronic | ICD-10-CM

## 2025-08-18 DIAGNOSIS — Z90.49 ACQUIRED ABSENCE OF OTHER SPECIFIED PARTS OF DIGESTIVE TRACT: Chronic | ICD-10-CM

## 2025-08-18 DIAGNOSIS — Z00.00 ENCOUNTER FOR GENERAL ADULT MEDICAL EXAMINATION WITHOUT ABNORMAL FINDINGS: ICD-10-CM

## 2025-08-18 PROCEDURE — 77063 BREAST TOMOSYNTHESIS BI: CPT | Mod: 26

## 2025-08-18 PROCEDURE — 77063 BREAST TOMOSYNTHESIS BI: CPT

## 2025-08-18 PROCEDURE — 77067 SCR MAMMO BI INCL CAD: CPT | Mod: 26

## 2025-08-18 PROCEDURE — 77067 SCR MAMMO BI INCL CAD: CPT
